# Patient Record
Sex: MALE | Race: WHITE | Employment: FULL TIME | URBAN - NONMETROPOLITAN AREA
[De-identification: names, ages, dates, MRNs, and addresses within clinical notes are randomized per-mention and may not be internally consistent; named-entity substitution may affect disease eponyms.]

---

## 2018-10-17 ENCOUNTER — HOSPITAL ENCOUNTER (OUTPATIENT)
Dept: ULTRASOUND IMAGING | Age: 28
Discharge: HOME | End: 2018-10-17
Payer: COMMERCIAL

## 2018-10-17 DIAGNOSIS — N50.82 SCROTAL PAIN: ICD-10-CM

## 2018-10-17 DIAGNOSIS — N50.89 SCROTAL SWELLING: ICD-10-CM

## 2018-10-17 PROCEDURE — 76870 US EXAM SCROTUM: CPT

## 2018-10-29 ENCOUNTER — TELEPHONE (OUTPATIENT)
Dept: UROLOGY | Age: 28
End: 2018-10-29

## 2018-10-29 ENCOUNTER — OFFICE VISIT (OUTPATIENT)
Dept: UROLOGY | Age: 28
End: 2018-10-29
Payer: COMMERCIAL

## 2018-10-29 VITALS
HEIGHT: 71 IN | SYSTOLIC BLOOD PRESSURE: 128 MMHG | DIASTOLIC BLOOD PRESSURE: 78 MMHG | BODY MASS INDEX: 33.74 KG/M2 | WEIGHT: 241 LBS

## 2018-10-29 DIAGNOSIS — N43.3 HYDROCELE, UNSPECIFIED HYDROCELE TYPE: Primary | ICD-10-CM

## 2018-10-29 DIAGNOSIS — R35.1 NOCTURIA: ICD-10-CM

## 2018-10-29 DIAGNOSIS — Z01.818 PRE-OP TESTING: ICD-10-CM

## 2018-10-29 DIAGNOSIS — N43.3 LEFT HYDROCELE: Primary | ICD-10-CM

## 2018-10-29 DIAGNOSIS — I10 HYPERTENSION, UNSPECIFIED TYPE: ICD-10-CM

## 2018-10-29 LAB
BILIRUBIN URINE: NEGATIVE
BLOOD URINE, POC: NEGATIVE
CHARACTER, URINE: CLEAR
COLOR, URINE: YELLOW
GLUCOSE URINE: NEGATIVE MG/DL
KETONES, URINE: NEGATIVE
LEUKOCYTE CLUMPS, URINE: NEGATIVE
NITRITE, URINE: NEGATIVE
PH, URINE: 6
PROTEIN, URINE: NEGATIVE MG/DL
SPECIFIC GRAVITY, URINE: 1.02 (ref 1–1.03)
UROBILINOGEN, URINE: 0.2 EU/DL

## 2018-10-29 PROCEDURE — 51798 US URINE CAPACITY MEASURE: CPT | Performed by: NURSE PRACTITIONER

## 2018-10-29 PROCEDURE — 99203 OFFICE O/P NEW LOW 30 MIN: CPT | Performed by: NURSE PRACTITIONER

## 2018-10-29 PROCEDURE — 81003 URINALYSIS AUTO W/O SCOPE: CPT | Performed by: NURSE PRACTITIONER

## 2018-10-29 RX ORDER — KETOROLAC TROMETHAMINE 10 MG/1
10 TABLET, FILM COATED ORAL EVERY 6 HOURS PRN
Qty: 20 TABLET | Refills: 0 | Status: SHIPPED | OUTPATIENT
Start: 2018-10-29 | End: 2018-12-07

## 2018-10-29 RX ORDER — LISINOPRIL 20 MG/1
20 TABLET ORAL DAILY
COMMUNITY

## 2018-10-29 NOTE — TELEPHONE ENCOUNTER
DO NOT TAKE ASPIRIN, PLAVIX, COUMADIN, OR MOTRIN-LIKE DRUGS 7 DAYS      PRIOR TO SURGERY AND 3 DAYS FOLLOWING     Sophie Stevens 1990 Diagnosis: Hydrocele, left    Surgical Physician:   Dr Virginia Alegria. Dana Kirkpatrick have been scheduled for the procedure marked below:     Surgery -Left hydrocelectomy         Date: 11/29/18     Anesthesia: Anesthesiologist (General/Spinal)     Place of Service: [facility]         Please be at the Outpatient Department Second Floor at:  12:30am        INSTRUCTIONS AS MARKED BELOW:    1. We prefer that you shower or bathe with liquid antibacterial soap, like Dial,the day of surgery. 2.  If you are having (General) Anesthesia:  DO NOT eat or drink anything after midnight before surgery. 3.  Pre-op testing to be done on or before 11/15/18. Fasting required 8 hours prior. 4.  Take the blood pressure medicine am of surgery with SIP of water. 5.  PLEASE BRING THIS LETTER WITH YOU AND SHOW IT TO THE  AT [place] [address]. 6.  Does patient have a [employer]? No  7. Please send a copy to the Family Dr: Warren Kwong MD  8. If you are a Medicare patient please bring your home Medications with you. 9. Please ensure to bring a  with you the day of the surgery to transport you home.   10. Surgery F/U with Raymundo Guillen CNP 12/7/2018 1200pm      Date: 10/29/2018

## 2018-10-30 ENCOUNTER — TELEPHONE (OUTPATIENT)
Dept: UROLOGY | Age: 28
End: 2018-10-30

## 2018-10-31 ENCOUNTER — TELEPHONE (OUTPATIENT)
Dept: UROLOGY | Age: 28
End: 2018-10-31

## 2018-11-01 LAB — POST VOID RESIDUAL (PVR): 45 ML

## 2018-11-06 ENCOUNTER — TELEPHONE (OUTPATIENT)
Dept: UROLOGY | Age: 28
End: 2018-11-06

## 2018-11-14 ENCOUNTER — TELEPHONE (OUTPATIENT)
Dept: UROLOGY | Age: 28
End: 2018-11-14

## 2018-11-15 ENCOUNTER — HOSPITAL ENCOUNTER (OUTPATIENT)
Age: 28
Discharge: HOME | End: 2018-11-15
Payer: COMMERCIAL

## 2018-11-15 DIAGNOSIS — N43.3 LEFT HYDROCELE: ICD-10-CM

## 2018-11-15 DIAGNOSIS — I10 HYPERTENSION, UNSPECIFIED TYPE: ICD-10-CM

## 2018-11-15 DIAGNOSIS — Z01.818 PRE-OP TESTING: ICD-10-CM

## 2018-11-15 LAB
ANION GAP SERPL CALCULATED.3IONS-SCNC: 13 MEQ/L (ref 8–16)
BASOPHILS # BLD: 1.1 %
BASOPHILS ABSOLUTE: 0.1 THOU/MM3 (ref 0–0.1)
BILIRUBIN URINE: NEGATIVE
BLOOD, URINE: NEGATIVE
BUN BLDV-MCNC: 12 MG/DL (ref 7–22)
CALCIUM SERPL-MCNC: 9.8 MG/DL (ref 8.5–10.5)
CHARACTER, URINE: CLEAR
CHLORIDE BLD-SCNC: 101 MEQ/L (ref 98–111)
CO2: 27 MEQ/L (ref 23–33)
COLOR: YELLOW
CREAT SERPL-MCNC: 0.9 MG/DL (ref 0.4–1.2)
EKG ATRIAL RATE: 85 BPM
EKG P AXIS: 61 DEGREES
EKG P-R INTERVAL: 146 MS
EKG Q-T INTERVAL: 366 MS
EKG QRS DURATION: 86 MS
EKG QTC CALCULATION (BAZETT): 435 MS
EKG R AXIS: 48 DEGREES
EKG T AXIS: 43 DEGREES
EKG VENTRICULAR RATE: 85 BPM
EOSINOPHIL # BLD: 1.6 %
EOSINOPHILS ABSOLUTE: 0.1 THOU/MM3 (ref 0–0.4)
ERYTHROCYTE [DISTWIDTH] IN BLOOD BY AUTOMATED COUNT: 11.8 % (ref 11.5–14.5)
ERYTHROCYTE [DISTWIDTH] IN BLOOD BY AUTOMATED COUNT: 38 FL (ref 35–45)
GFR SERPL CREATININE-BSD FRML MDRD: > 90 ML/MIN/1.73M2
GLUCOSE BLD-MCNC: 92 MG/DL (ref 70–108)
GLUCOSE URINE: NEGATIVE MG/DL
HCT VFR BLD CALC: 48.9 % (ref 42–52)
HEMOGLOBIN: 16.5 GM/DL (ref 14–18)
IMMATURE GRANS (ABS): 0.04 THOU/MM3 (ref 0–0.07)
IMMATURE GRANULOCYTES: 0.5 %
KETONES, URINE: NEGATIVE
LEUKOCYTE ESTERASE, URINE: NEGATIVE
LYMPHOCYTES # BLD: 31.6 %
LYMPHOCYTES ABSOLUTE: 2.6 THOU/MM3 (ref 1–4.8)
MCH RBC QN AUTO: 30.2 PG (ref 26–33)
MCHC RBC AUTO-ENTMCNC: 33.7 GM/DL (ref 32.2–35.5)
MCV RBC AUTO: 89.4 FL (ref 80–94)
MONOCYTES # BLD: 8.2 %
MONOCYTES ABSOLUTE: 0.7 THOU/MM3 (ref 0.4–1.3)
NITRITE, URINE: NEGATIVE
NUCLEATED RED BLOOD CELLS: 0 /100 WBC
PH UA: 6
PLATELET # BLD: 266 THOU/MM3 (ref 130–400)
PMV BLD AUTO: 10.4 FL (ref 9.4–12.4)
POTASSIUM SERPL-SCNC: 4.3 MEQ/L (ref 3.5–5.2)
PROTEIN UA: NEGATIVE
RBC # BLD: 5.47 MILL/MM3 (ref 4.7–6.1)
SEG NEUTROPHILS: 57 %
SEGMENTED NEUTROPHILS ABSOLUTE COUNT: 4.6 THOU/MM3 (ref 1.8–7.7)
SODIUM BLD-SCNC: 141 MEQ/L (ref 135–145)
SPECIFIC GRAVITY, URINE: 1.02 (ref 1–1.03)
UROBILINOGEN, URINE: 0.2 EU/DL
WBC # BLD: 8.1 THOU/MM3 (ref 4.8–10.8)

## 2018-11-15 PROCEDURE — 36415 COLL VENOUS BLD VENIPUNCTURE: CPT

## 2018-11-15 PROCEDURE — 80048 BASIC METABOLIC PNL TOTAL CA: CPT

## 2018-11-15 PROCEDURE — 85025 COMPLETE CBC W/AUTO DIFF WBC: CPT

## 2018-11-15 PROCEDURE — 81003 URINALYSIS AUTO W/O SCOPE: CPT

## 2018-11-15 PROCEDURE — 93005 ELECTROCARDIOGRAM TRACING: CPT | Performed by: NURSE PRACTITIONER

## 2018-11-15 PROCEDURE — 93010 ELECTROCARDIOGRAM REPORT: CPT | Performed by: INTERNAL MEDICINE

## 2018-11-29 ENCOUNTER — HOSPITAL ENCOUNTER (OUTPATIENT)
Age: 28
Setting detail: OUTPATIENT SURGERY
Discharge: HOME | End: 2018-11-29
Attending: UROLOGY | Admitting: UROLOGY
Payer: COMMERCIAL

## 2018-11-29 ENCOUNTER — ANESTHESIA EVENT (OUTPATIENT)
Dept: OPERATING ROOM | Age: 28
End: 2018-11-29
Payer: COMMERCIAL

## 2018-11-29 ENCOUNTER — ANESTHESIA (OUTPATIENT)
Dept: OPERATING ROOM | Age: 28
End: 2018-11-29
Payer: COMMERCIAL

## 2018-11-29 VITALS
BODY MASS INDEX: 32.99 KG/M2 | WEIGHT: 235.67 LBS | RESPIRATION RATE: 16 BRPM | DIASTOLIC BLOOD PRESSURE: 70 MMHG | HEIGHT: 71 IN | HEART RATE: 90 BPM | OXYGEN SATURATION: 95 % | SYSTOLIC BLOOD PRESSURE: 137 MMHG | TEMPERATURE: 97.2 F

## 2018-11-29 VITALS
OXYGEN SATURATION: 98 % | SYSTOLIC BLOOD PRESSURE: 92 MMHG | DIASTOLIC BLOOD PRESSURE: 42 MMHG | RESPIRATION RATE: 1 BRPM

## 2018-11-29 DIAGNOSIS — N43.2 OTHER HYDROCELE: Primary | ICD-10-CM

## 2018-11-29 PROCEDURE — 2500000003 HC RX 250 WO HCPCS: Performed by: UROLOGY

## 2018-11-29 PROCEDURE — 88302 TISSUE EXAM BY PATHOLOGIST: CPT

## 2018-11-29 PROCEDURE — 3600000002 HC SURGERY LEVEL 2 BASE: Performed by: UROLOGY

## 2018-11-29 PROCEDURE — 7100000011 HC PHASE II RECOVERY - ADDTL 15 MIN: Performed by: UROLOGY

## 2018-11-29 PROCEDURE — 2580000003 HC RX 258

## 2018-11-29 PROCEDURE — 3700000000 HC ANESTHESIA ATTENDED CARE: Performed by: UROLOGY

## 2018-11-29 PROCEDURE — 6370000000 HC RX 637 (ALT 250 FOR IP): Performed by: NURSE PRACTITIONER

## 2018-11-29 PROCEDURE — 2500000003 HC RX 250 WO HCPCS: Performed by: NURSE ANESTHETIST, CERTIFIED REGISTERED

## 2018-11-29 PROCEDURE — 3600000012 HC SURGERY LEVEL 2 ADDTL 15MIN: Performed by: UROLOGY

## 2018-11-29 PROCEDURE — 7100000001 HC PACU RECOVERY - ADDTL 15 MIN: Performed by: UROLOGY

## 2018-11-29 PROCEDURE — 7100000000 HC PACU RECOVERY - FIRST 15 MIN: Performed by: UROLOGY

## 2018-11-29 PROCEDURE — 7100000010 HC PHASE II RECOVERY - FIRST 15 MIN: Performed by: UROLOGY

## 2018-11-29 PROCEDURE — 3700000001 HC ADD 15 MINUTES (ANESTHESIA): Performed by: UROLOGY

## 2018-11-29 PROCEDURE — 55040 REMOVAL OF HYDROCELE: CPT | Performed by: UROLOGY

## 2018-11-29 PROCEDURE — 6360000002 HC RX W HCPCS

## 2018-11-29 PROCEDURE — 2580000003 HC RX 258: Performed by: NURSE ANESTHETIST, CERTIFIED REGISTERED

## 2018-11-29 PROCEDURE — 6360000002 HC RX W HCPCS: Performed by: NURSE PRACTITIONER

## 2018-11-29 PROCEDURE — 2709999900 HC NON-CHARGEABLE SUPPLY: Performed by: UROLOGY

## 2018-11-29 PROCEDURE — 6360000002 HC RX W HCPCS: Performed by: NURSE ANESTHETIST, CERTIFIED REGISTERED

## 2018-11-29 RX ORDER — MIDAZOLAM HYDROCHLORIDE 1 MG/ML
INJECTION INTRAMUSCULAR; INTRAVENOUS PRN
Status: DISCONTINUED | OUTPATIENT
Start: 2018-11-29 | End: 2018-11-29 | Stop reason: SDUPTHER

## 2018-11-29 RX ORDER — OXYCODONE HYDROCHLORIDE AND ACETAMINOPHEN 5; 325 MG/1; MG/1
1 TABLET ORAL EVERY 6 HOURS PRN
Qty: 20 TABLET | Refills: 0 | Status: SHIPPED | OUTPATIENT
Start: 2018-11-29 | End: 2018-12-04

## 2018-11-29 RX ORDER — ONDANSETRON 2 MG/ML
4 INJECTION INTRAMUSCULAR; INTRAVENOUS EVERY 4 HOURS PRN
Status: DISCONTINUED | OUTPATIENT
Start: 2018-11-29 | End: 2018-11-29 | Stop reason: HOSPADM

## 2018-11-29 RX ORDER — SODIUM CHLORIDE 9 MG/ML
INJECTION, SOLUTION INTRAVENOUS CONTINUOUS
Status: DISCONTINUED | OUTPATIENT
Start: 2018-11-29 | End: 2018-11-29 | Stop reason: HOSPADM

## 2018-11-29 RX ORDER — OXYCODONE HYDROCHLORIDE AND ACETAMINOPHEN 5; 325 MG/1; MG/1
2 TABLET ORAL EVERY 4 HOURS PRN
Status: DISCONTINUED | OUTPATIENT
Start: 2018-11-29 | End: 2018-11-29 | Stop reason: HOSPADM

## 2018-11-29 RX ORDER — OXYCODONE HYDROCHLORIDE AND ACETAMINOPHEN 5; 325 MG/1; MG/1
1 TABLET ORAL EVERY 4 HOURS PRN
Status: DISCONTINUED | OUTPATIENT
Start: 2018-11-29 | End: 2018-11-29 | Stop reason: HOSPADM

## 2018-11-29 RX ORDER — PROMETHAZINE HYDROCHLORIDE 25 MG/ML
25 INJECTION, SOLUTION INTRAMUSCULAR; INTRAVENOUS ONCE
Status: COMPLETED | OUTPATIENT
Start: 2018-11-29 | End: 2018-11-29

## 2018-11-29 RX ORDER — SODIUM CHLORIDE 9 MG/ML
INJECTION, SOLUTION INTRAVENOUS CONTINUOUS PRN
Status: DISCONTINUED | OUTPATIENT
Start: 2018-11-29 | End: 2018-11-29 | Stop reason: SDUPTHER

## 2018-11-29 RX ORDER — KETOROLAC TROMETHAMINE 30 MG/ML
30 INJECTION, SOLUTION INTRAMUSCULAR; INTRAVENOUS EVERY 6 HOURS PRN
Status: DISCONTINUED | OUTPATIENT
Start: 2018-11-29 | End: 2018-11-29 | Stop reason: HOSPADM

## 2018-11-29 RX ORDER — CIPROFLOXACIN 2 MG/ML
400 INJECTION, SOLUTION INTRAVENOUS
Status: COMPLETED | OUTPATIENT
Start: 2018-11-29 | End: 2018-11-29

## 2018-11-29 RX ORDER — PROPOFOL 10 MG/ML
INJECTION, EMULSION INTRAVENOUS PRN
Status: DISCONTINUED | OUTPATIENT
Start: 2018-11-29 | End: 2018-11-29 | Stop reason: SDUPTHER

## 2018-11-29 RX ORDER — PROMETHAZINE HYDROCHLORIDE 25 MG/ML
INJECTION, SOLUTION INTRAMUSCULAR; INTRAVENOUS
Status: DISCONTINUED
Start: 2018-11-29 | End: 2018-11-29 | Stop reason: HOSPADM

## 2018-11-29 RX ORDER — MORPHINE SULFATE 2 MG/ML
2 INJECTION, SOLUTION INTRAMUSCULAR; INTRAVENOUS
Status: DISCONTINUED | OUTPATIENT
Start: 2018-11-29 | End: 2018-11-29 | Stop reason: HOSPADM

## 2018-11-29 RX ORDER — BUPIVACAINE HYDROCHLORIDE 5 MG/ML
INJECTION, SOLUTION PERINEURAL PRN
Status: DISCONTINUED | OUTPATIENT
Start: 2018-11-29 | End: 2018-11-29 | Stop reason: HOSPADM

## 2018-11-29 RX ORDER — CEPHALEXIN 500 MG/1
500 CAPSULE ORAL 3 TIMES DAILY
Qty: 15 CAPSULE | Refills: 0 | Status: SHIPPED | OUTPATIENT
Start: 2018-11-29 | End: 2018-12-04

## 2018-11-29 RX ORDER — ONDANSETRON 2 MG/ML
INJECTION INTRAMUSCULAR; INTRAVENOUS PRN
Status: DISCONTINUED | OUTPATIENT
Start: 2018-11-29 | End: 2018-11-29 | Stop reason: SDUPTHER

## 2018-11-29 RX ORDER — MORPHINE SULFATE 2 MG/ML
4 INJECTION, SOLUTION INTRAMUSCULAR; INTRAVENOUS
Status: DISCONTINUED | OUTPATIENT
Start: 2018-11-29 | End: 2018-11-29 | Stop reason: HOSPADM

## 2018-11-29 RX ORDER — DEXAMETHASONE SODIUM PHOSPHATE 4 MG/ML
INJECTION, SOLUTION INTRA-ARTICULAR; INTRALESIONAL; INTRAMUSCULAR; INTRAVENOUS; SOFT TISSUE PRN
Status: DISCONTINUED | OUTPATIENT
Start: 2018-11-29 | End: 2018-11-29 | Stop reason: SDUPTHER

## 2018-11-29 RX ORDER — LIDOCAINE HYDROCHLORIDE 20 MG/ML
INJECTION, SOLUTION INFILTRATION; PERINEURAL PRN
Status: DISCONTINUED | OUTPATIENT
Start: 2018-11-29 | End: 2018-11-29 | Stop reason: SDUPTHER

## 2018-11-29 RX ORDER — HYDROMORPHONE HCL 110MG/55ML
PATIENT CONTROLLED ANALGESIA SYRINGE INTRAVENOUS PRN
Status: DISCONTINUED | OUTPATIENT
Start: 2018-11-29 | End: 2018-11-29 | Stop reason: SDUPTHER

## 2018-11-29 RX ORDER — ATROPA BELLADONNA AND OPIUM 16.2; 3 MG/1; MG/1
30 SUPPOSITORY RECTAL EVERY 8 HOURS PRN
Status: DISCONTINUED | OUTPATIENT
Start: 2018-11-29 | End: 2018-11-29 | Stop reason: HOSPADM

## 2018-11-29 RX ADMIN — HYDROMORPHONE HYDROCHLORIDE 1 MG: 2 INJECTION INTRAMUSCULAR; INTRAVENOUS; SUBCUTANEOUS at 15:19

## 2018-11-29 RX ADMIN — MIDAZOLAM HYDROCHLORIDE 2 MG: 1 INJECTION, SOLUTION INTRAMUSCULAR; INTRAVENOUS at 15:19

## 2018-11-29 RX ADMIN — PROPOFOL 200 MG: 10 INJECTION, EMULSION INTRAVENOUS at 15:19

## 2018-11-29 RX ADMIN — SODIUM CHLORIDE: 9 INJECTION, SOLUTION INTRAVENOUS at 13:42

## 2018-11-29 RX ADMIN — PROMETHAZINE HYDROCHLORIDE 25 MG: 25 INJECTION INTRAMUSCULAR; INTRAVENOUS at 17:45

## 2018-11-29 RX ADMIN — OXYCODONE AND ACETAMINOPHEN 1 TABLET: 5; 325 TABLET ORAL at 18:18

## 2018-11-29 RX ADMIN — DEXAMETHASONE SODIUM PHOSPHATE 10 MG: 4 INJECTION, SOLUTION INTRAMUSCULAR; INTRAVENOUS at 15:25

## 2018-11-29 RX ADMIN — HYDROMORPHONE HYDROCHLORIDE 1 MG: 2 INJECTION INTRAMUSCULAR; INTRAVENOUS; SUBCUTANEOUS at 15:38

## 2018-11-29 RX ADMIN — LIDOCAINE HYDROCHLORIDE 100 MG: 20 INJECTION, SOLUTION INFILTRATION; PERINEURAL at 15:19

## 2018-11-29 RX ADMIN — ONDANSETRON 4 MG: 2 INJECTION INTRAMUSCULAR; INTRAVENOUS at 16:44

## 2018-11-29 RX ADMIN — CIPROFLOXACIN 400 MG: 2 INJECTION, SOLUTION INTRAVENOUS at 15:22

## 2018-11-29 RX ADMIN — ONDANSETRON HYDROCHLORIDE 4 MG: 4 INJECTION, SOLUTION INTRAMUSCULAR; INTRAVENOUS at 15:25

## 2018-11-29 RX ADMIN — SODIUM CHLORIDE: 9 INJECTION, SOLUTION INTRAVENOUS at 15:19

## 2018-11-29 ASSESSMENT — PULMONARY FUNCTION TESTS
PIF_VALUE: 6
PIF_VALUE: 3
PIF_VALUE: 2
PIF_VALUE: 3
PIF_VALUE: 19
PIF_VALUE: 0
PIF_VALUE: 3
PIF_VALUE: 2
PIF_VALUE: 26
PIF_VALUE: 4
PIF_VALUE: 10
PIF_VALUE: 2
PIF_VALUE: 5
PIF_VALUE: 16
PIF_VALUE: 4
PIF_VALUE: 2
PIF_VALUE: 18
PIF_VALUE: 3
PIF_VALUE: 18
PIF_VALUE: 4
PIF_VALUE: 2
PIF_VALUE: 1
PIF_VALUE: 2
PIF_VALUE: 2
PIF_VALUE: 10
PIF_VALUE: 3
PIF_VALUE: 3
PIF_VALUE: 4
PIF_VALUE: 16
PIF_VALUE: 3
PIF_VALUE: 3
PIF_VALUE: 17
PIF_VALUE: 3
PIF_VALUE: 4
PIF_VALUE: 4
PIF_VALUE: 3
PIF_VALUE: 4
PIF_VALUE: 14
PIF_VALUE: 2
PIF_VALUE: 9
PIF_VALUE: 18
PIF_VALUE: 24
PIF_VALUE: 1
PIF_VALUE: 3
PIF_VALUE: 1
PIF_VALUE: 2
PIF_VALUE: 4
PIF_VALUE: 16
PIF_VALUE: 3
PIF_VALUE: 2
PIF_VALUE: 16
PIF_VALUE: 3
PIF_VALUE: 16
PIF_VALUE: 3
PIF_VALUE: 3
PIF_VALUE: 5
PIF_VALUE: 18
PIF_VALUE: 10
PIF_VALUE: 14
PIF_VALUE: 2
PIF_VALUE: 3
PIF_VALUE: 3
PIF_VALUE: 14
PIF_VALUE: 1
PIF_VALUE: 4
PIF_VALUE: 2
PIF_VALUE: 0

## 2018-11-29 ASSESSMENT — PAIN SCALES - GENERAL
PAINLEVEL_OUTOF10: 0
PAINLEVEL_OUTOF10: 0
PAINLEVEL_OUTOF10: 3
PAINLEVEL_OUTOF10: 3
PAINLEVEL_OUTOF10: 4
PAINLEVEL_OUTOF10: 3

## 2018-11-29 ASSESSMENT — PAIN DESCRIPTION - PROGRESSION
CLINICAL_PROGRESSION: GRADUALLY IMPROVING
CLINICAL_PROGRESSION: GRADUALLY WORSENING

## 2018-11-29 ASSESSMENT — PAIN DESCRIPTION - FREQUENCY
FREQUENCY: INTERMITTENT
FREQUENCY: INTERMITTENT

## 2018-11-29 ASSESSMENT — PAIN - FUNCTIONAL ASSESSMENT: PAIN_FUNCTIONAL_ASSESSMENT: 0-10

## 2018-11-29 ASSESSMENT — PAIN DESCRIPTION - LOCATION
LOCATION: SCROTUM
LOCATION: SCROTUM

## 2018-11-29 ASSESSMENT — PAIN DESCRIPTION - ORIENTATION
ORIENTATION: LEFT
ORIENTATION: LEFT

## 2018-11-29 ASSESSMENT — PAIN DESCRIPTION - PAIN TYPE
TYPE: SURGICAL PAIN
TYPE: SURGICAL PAIN

## 2018-11-29 ASSESSMENT — PAIN DESCRIPTION - DESCRIPTORS
DESCRIPTORS: BURNING
DESCRIPTORS: BURNING

## 2018-11-29 NOTE — ANESTHESIA PRE PROCEDURE
Past Medical History:        Diagnosis Date    Hypertension        Past Surgical History:        Procedure Laterality Date    KNEE SURGERY  y+10    right knee       Social History:    Social History   Substance Use Topics    Smoking status: Never Smoker    Smokeless tobacco: Never Used    Alcohol use 7.2 oz/week     12 Cans of beer per week      Comment: SOCIALLY                                Counseling given: Not Answered      Vital Signs (Current):   Vitals:    11/20/18 1003 11/29/18 1308 11/29/18 1315   BP:  (!) 148/86    Pulse:  77    Resp:  17    Temp:  98 °F (36.7 °C)    TempSrc:  Temporal    SpO2:  97%    Weight: 240 lb (108.9 kg)  235 lb 10.8 oz (106.9 kg)   Height: 5' 11\" (1.803 m)  5' 11\" (1.803 m)                                              BP Readings from Last 3 Encounters:   11/29/18 (!) 148/86   10/29/18 128/78       NPO Status: Time of last liquid consumption: 1930                        Time of last solid consumption: 1930                        Date of last liquid consumption: 11/28/18                        Date of last solid food consumption: 11/28/18    BMI:   Wt Readings from Last 3 Encounters:   11/29/18 235 lb 10.8 oz (106.9 kg)   10/29/18 241 lb (109.3 kg)     Body mass index is 32.87 kg/m². CBC:   Lab Results   Component Value Date    WBC 8.1 11/15/2018    RBC 5.47 11/15/2018    HGB 16.5 11/15/2018    HCT 48.9 11/15/2018    MCV 89.4 11/15/2018     11/15/2018       CMP:   Lab Results   Component Value Date     11/15/2018    K 4.3 11/15/2018     11/15/2018    CO2 27 11/15/2018    BUN 12 11/15/2018    CREATININE 0.9 11/15/2018    LABGLOM >90 11/15/2018    GLUCOSE 92 11/15/2018    CALCIUM 9.8 11/15/2018       POC Tests: No results for input(s): POCGLU, POCNA, POCK, POCCL, POCBUN, POCHEMO, POCHCT in the last 72 hours.     Coags: No results found for: PROTIME, INR, APTT    HCG (If Applicable): No results found for: PREGTESTUR, PREGSERUM, HCG, HCGQUANT     ABGs: No results found for: PHART, PO2ART, JVS2XYZ, EGQ4WFQ, BEART, H3NREQVL     Type & Screen (If Applicable):  No results found for: LABABO, [address]    Anesthesia Evaluation  Patient summary reviewed and Nursing notes reviewed no history of anesthetic complications:   Airway: Mallampati: II  TM distance: >3 FB   Neck ROM: full  Mouth opening: > = 3 FB Dental:          Pulmonary: breath sounds clear to auscultation                             Cardiovascular:    (+) hypertension:,         Rhythm: regular  Rate: normal                    Neuro/Psych:               GI/Hepatic/Renal:             Endo/Other:                     Abdominal:       Abdomen: soft. Vascular:                                        Anesthesia Plan      general     ASA 2       Induction: intravenous. MIPS: Postoperative opioids intended and Prophylactic antiemetics administered. Anesthetic plan and risks discussed with patient and spouse.       Plan discussed with CRNA, attending and surgical team.                  Marylee Marek, DO   11/29/2018

## 2018-11-30 NOTE — BRIEF OP NOTE
Brief Postoperative Note  ______________________________________________________________    Patient: May Ayala  YOB: 1990  MRN: [id number]  Date of Procedure: 11/29/2018    Pre-Op Diagnosis: LEFT HYDROCELE    Post-Op Diagnosis: Same with very significant scaring and induration, difficult procedure       Procedure(s):  LEFT HYDROCELECTOMY -- ADDED PROCEDURAL SERVICES    Anesthesia: General    Surgeon(s):  Amarilys Alves MD    Assistant: none    Estimated Blood Loss (mL): 10 cc    Complications: none    Specimens:   ID Type Source Tests Collected by Time Destination   A : Left Hydrocele Sac Tissue Scrotum SURGICAL PATHOLOGY Amarilys Alves MD 11/29/2018 1559        Implants:  * No implants in log *      Drains:      Findings: hydrocele sac    Amarilys Alves MD

## 2018-12-07 ENCOUNTER — OFFICE VISIT (OUTPATIENT)
Dept: UROLOGY | Age: 28
End: 2018-12-07
Payer: COMMERCIAL

## 2018-12-07 VITALS
DIASTOLIC BLOOD PRESSURE: 78 MMHG | HEIGHT: 71 IN | WEIGHT: 241.7 LBS | SYSTOLIC BLOOD PRESSURE: 128 MMHG | BODY MASS INDEX: 33.84 KG/M2

## 2018-12-07 DIAGNOSIS — N43.2 OTHER HYDROCELE: Primary | ICD-10-CM

## 2018-12-07 LAB
BILIRUBIN URINE: NEGATIVE
BLOOD URINE, POC: NORMAL
CHARACTER, URINE: CLEAR
COLOR, URINE: YELLOW
GLUCOSE URINE: NEGATIVE MG/DL
KETONES, URINE: NEGATIVE
LEUKOCYTE CLUMPS, URINE: NEGATIVE
NITRITE, URINE: NEGATIVE
PH, URINE: 5.5
PROTEIN, URINE: NEGATIVE MG/DL
SPECIFIC GRAVITY, URINE: 1.01 (ref 1–1.03)
UROBILINOGEN, URINE: 0.2 EU/DL

## 2018-12-07 PROCEDURE — 81003 URINALYSIS AUTO W/O SCOPE: CPT | Performed by: NURSE PRACTITIONER

## 2018-12-07 PROCEDURE — 99024 POSTOP FOLLOW-UP VISIT: CPT | Performed by: NURSE PRACTITIONER

## 2021-07-14 ENCOUNTER — HOSPITAL ENCOUNTER (OUTPATIENT)
Age: 31
Discharge: HOME | End: 2021-07-14
Payer: COMMERCIAL

## 2021-07-14 ENCOUNTER — HOSPITAL ENCOUNTER (OUTPATIENT)
Dept: GENERAL RADIOLOGY | Age: 31
Discharge: HOME | End: 2021-07-14
Payer: COMMERCIAL

## 2021-07-14 DIAGNOSIS — M54.50 LOW BACK PAIN, UNSPECIFIED BACK PAIN LATERALITY, UNSPECIFIED CHRONICITY, UNSPECIFIED WHETHER SCIATICA PRESENT: ICD-10-CM

## 2021-07-14 PROCEDURE — 72100 X-RAY EXAM L-S SPINE 2/3 VWS: CPT

## 2021-11-09 ENCOUNTER — HOSPITAL ENCOUNTER (OUTPATIENT)
Dept: MRI IMAGING | Age: 31
Discharge: HOME | End: 2021-11-09
Payer: COMMERCIAL

## 2021-11-09 DIAGNOSIS — M54.50 LOW BACK PAIN, UNSPECIFIED BACK PAIN LATERALITY, UNSPECIFIED CHRONICITY, UNSPECIFIED WHETHER SCIATICA PRESENT: ICD-10-CM

## 2021-11-09 DIAGNOSIS — M54.16 RIGHT LUMBAR RADICULOPATHY: ICD-10-CM

## 2021-11-09 PROCEDURE — 72148 MRI LUMBAR SPINE W/O DYE: CPT

## 2022-01-06 ENCOUNTER — HOSPITAL ENCOUNTER (OUTPATIENT)
Dept: PHYSICAL THERAPY | Age: 32
Setting detail: THERAPIES SERIES
Discharge: HOME | End: 2022-01-06
Payer: COMMERCIAL

## 2022-01-06 PROCEDURE — 97161 PT EVAL LOW COMPLEX 20 MIN: CPT

## 2022-01-06 NOTE — PROGRESS NOTES
** PLEASE SIGN, DATE AND TIME CERTIFICATION BELOW AND RETURN TO [facility] OUTPATIENT REHABILITATION (FAX #: [phone]). ATTEST/CO-SIGN IF ACCESSING VIA [employer]. THANK YOU.**    I certify that I have examined the patient below and determined that Physical Medicine and Rehabilitation service is necessary and that I approve the established plan of care for up to 90 days or as specifically noted. Attestation, signature or co-signature of physician indicates approval of certification requirements.    ________________________ ____________ __________  Physician Signature   Date   Time  [address] [facility]  PHYSICAL THERAPY  [x] EVALUATION  [] DAILY NOTE (LAND) [] DAILY NOTE (AQUATIC ) [] PROGRESS NOTE [] DISCHARGE NOTE    [] [address] [facility]   [x] [place] [address]    [] [address] [facility]   [] [facility]    Date: 2022  Patient Name:  Esperanza Moise  : 1990  MRN: [id number]  CSN: [id number]    Referring Practitioner Colten Wilkins DO   Diagnosis Other intervertebral disc displacement, lumbosacral region [M51.27]    Treatment Diagnosis Core weakness, right hamstring tightness   Date of Evaluation 22    Additional Pertinent History HTN. Functional Outcome Measure Used Modified Oswestry   Functional Outcome Score 50 (22)       Insurance: Primary: Payor: Chalo Alas /  /  / ,   Secondary:    Authorization Information: UNABLE TO GET SPECIFIC BENEFIT DETAILS. DID LEAVE A VOICEMAIL FOR A YENY TO LET US KNOW HOW MANY VISITS, IF PRE-CERT IS REQUIRED, AND  IF SO WHERE WE GET THAT THROUGH. AT THIS POINT WE DO NOT HAVE THESE DETAILS. IT WILL BE UP TO THE PATIENT TO GET THESE BENEFITS   Visit # 1, 1/10 for progress note   Visits Allowed:    Recertification Date: 7/10/35   Physician Follow-Up: 3/26/22   Physician Orders:    History of Present Illness: Pt had back pain for 15 years but worsened over the past 3 years.  Pt was unable to push kids in [employer] or shopping cart over the past year. MRI showed L5-S1 disc herniation pressing on right S1 nerve root. Pt underwent surgery 12/21/21, and bone spurs were found and shaved off as well. SUBJECTIVE: Pt reports incision looks good and glue just fell off this morning. Pt reports incisional pain only. Pt tolerated walking 3-4 miles the past 2 days. Pt feels the best he has in 10 years. Pt gets uncomfortable with prolonged sitting and standing so tries to change position before then. Pt instructed on no twisting and 10# lifting restriction, increasing 5# every 1-2 weeks. Pt takes ibuprofen once a day for pain but only as needed. Pt used ice initially after surgery but not as of late. Social/Functional History and Current Status:  Medications and Allergies have been reviewed and are listed on Medical History Questionnaire. Robby Gonzales lives with family in a single story home with 1 stairs and no handrail to enter. Task Previous Current   ADLs  Independent Independent   IADL's Independent Independent   Ambulation Independent Independent   Transfers Independent Independent   Recreation Independent- golf, active, take daily walks Independent   Community Integration Independent Independent   Driving Active  Active    Work [employer]. Occupation: [employer] sits at [employer].   returned to work 1/4/22     Objective:    OBSERVATION   Pain Tightness at incision, denies current pain   Palpation No tenderness, scar tissue formation at incision noted   Sensation intact   Edema    Spinal Accessory Motion    Bed Mobility Independent    Transfers Independent    Ambulation Good pace, equal step length, good arm swing, steady   Stairs    Balance        POSTURE    No Deficit Deficit Comments   Forward Head x     Rounded Shoulders  x    Kyphosis x     Lordosis x     Lateral Shift x     Scoliosis x     Iliac Crest x     PSIS      ASIS      Leg Length Discrp      Slumped Sitting x         TRUNK RANGE OF MOTION   Flexion: Limited in standing, able to reach to feet to richie socks/shoes in sitting   Extension:    Lateral Flexion Left:    Lateral Flexion Right:    Rotation Left:    Rotation Right:    Trunk Range of Motion is [facility]  []     LOWER EXTREMITY RANGE OF MOTION    Left Right Comments   Hip Flexion knee to chest WFL 25% limited    Hip Extension [facility]    Hip ABDuction [facility]    Hip ADDuction      Hip Internal Rotation [facility] WFL    Hip External Rotation WFL WFL    Hip Range of Motion is [facility]  []      Knee Flexion      Knee Extension      Knee Range of Motion is [facility]  [x]       LOWER EXTREMITY STRENGTH    Left Right Comments   Hip Flexion 5 5    Hip Abduction 5 5    Hip Adduction 4 4    Hip Extension      Hip External Rotation 5 5    Knee Flexion 5- 4+    Knee Extension 5- 4+    Ankle DF 5 5    Ankle PF      LE strength is WFL []      CORE STRENGTH   Mild to moderate core weakness noted with mobility, bridge       SPECIAL TESTS (+/-)    Left Right Comments   SLR  - +    90/90 Hamstring length 70 deg 40 deg with pain    SKTC - -    DKTC      Slump      SI Compression/Distraction      VIBHA - -    MELIDAIR - -    Sindy Silversmith            TREATMENT   Precautions: 10# lifting restrictions, no twisting   Pain:     X in shaded column indicates activity completed today   Modalities Parameters/  Location  Notes                     Manual Therapy Time/Technique  Notes                     Exercise/Intervention   Notes   SKTC   x Reviewed for HEP   DKTC   x \"   Hamstring SLR stretch with strap   x \"          Seated hamstring stretch   x \"          Posterior pelvic tilt, abdominal bracing   x \"   deadbug with opp UE/LE   x \"                          Specific Interventions Next Treatment: review HEP, progress core stabilization, develop HEP    Activity/Treatment Tolerance:  [x]  Patient tolerated treatment well  []  Patient limited by fatigue  []  Patient limited by pain   []  Patient planned outlined above.   []  Continue with current plan of care  []  Modify plan of care as follows:    []  Hold pending physician visit  []  Discharge    Time In 0844   Time Out 0914   Timed Code Minutes: 0 min   Total Treatment Time: 30 min     Electronically Signed by: Nimo Abdul PT

## 2022-01-25 ENCOUNTER — HOSPITAL ENCOUNTER (OUTPATIENT)
Dept: PHYSICAL THERAPY | Age: 32
Setting detail: THERAPIES SERIES
Discharge: HOME | End: 2022-01-25
Payer: COMMERCIAL

## 2022-01-25 PROCEDURE — 97110 THERAPEUTIC EXERCISES: CPT

## 2022-01-25 NOTE — PROGRESS NOTES
[address] [facility]  PHYSICAL THERAPY  [] EVALUATION  [x] DAILY NOTE (LAND) [] DAILY NOTE (AQUATIC ) [] PROGRESS NOTE [] DISCHARGE NOTE    [] [address] [facility]   [x] [place] [address]    [] [facility]   [] Melissa Harding    Date: 2022  Patient Name:  Jose Wilson  : 1990  MRN: [id number]  CSN: [id number]    Referring Practitioner Meseret Hussein DO   Diagnosis Other intervertebral disc displacement, lumbosacral region [M51.27]    Treatment Diagnosis Core weakness, right hamstring tightness   Date of Evaluation 22    Additional Pertinent History HTN. Functional Outcome Measure Used Modified Oswestry   Functional Outcome Score  (22)       Insurance: Primary: Payor: Zi Stokes /  /  / ,   Secondary:    Authorization Information: UNABLE TO GET SPECIFIC BENEFIT DETAILS. DID LEAVE A VOICEMAIL FOR A YENY TO LET US KNOW HOW MANY VISITS, IF PRE-CERT IS REQUIRED, AND  IF SO WHERE WE GET THAT THROUGH. AT THIS POINT WE DO NOT HAVE THESE DETAILS. IT WILL BE UP TO THE PATIENT TO GET THESE BENEFITS- PRECERT REQUIRED  *RECEIVED AUTH FOR 8 VISITS OF PT INCLUDING EVAL  FROM 22 TO 22, NO SPECIFIC CPT CODES WERE APPROVED. Visit # 2,  for progress note   Visits Allowed:    Recertification Date:    Physician Follow-Up: 3/26/22   Physician Orders:    History of Present Illness: Pt had back pain for 15 years but worsened over the past 3 years. Pt was unable to push kids in stroller or shopping cart over the past year. MRI showed L5-S1 disc herniation pressing on right S1 nerve root. Pt underwent surgery 21, and bone spurs were found and shaved off as well. SUBJECTIVE: Pt reports back keeps feeling better every day. Pt notes he is at 15# lifting restriction now. Pt lifted something heavier the other day, which didn't bother him initially, but 5 minutes later in felt it in his back.  Pt continues to walk 3-4 miles per day but only doing stretches 3x/wk. Pt sometimes forgets he even had the surgery because he feels so good. OBJECTIVE:    TREATMENT   Precautions: 15# lifting restrictions, no twisting   Pain: denies    X in shaded column indicates activity completed today   Modalities Parameters/  Location  Notes                     Manual Therapy Time/Technique  Notes                     Exercise/Intervention   Notes   SKTC 3x15 sec  x    DKTC 3x15 sec  x    Hamstring SLR stretch with strap 3x15 sec  x           Seated hamstring stretch              Posterior pelvic tilt, abdominal bracing 10x5 sec  x    deadbug with opp UE/LE 10  x    Abdominal bracing with resisted knee fall outs- bilat and unilat 10 orange x    SL hip ABD with abdominal bracing 10  x             Specific Interventions Next Treatment: review HEP, progress core stabilization, develop HEP    Activity/Treatment Tolerance:  [x]  Patient tolerated treatment well  []  Patient limited by fatigue  []  Patient limited by pain   []  Patient limited by medical complications  []  Other:     Assessment: Reviewed HEP with cues for difference between pelvic tilt and abdominal bracing. Introduced resisted knee falls outs and SL hip ABD with good tolerance. Core challenged with deadbug. No adverse effects to session. Goals    Patient Goal: Play golf by March 28    Short Term Goals: defer to LT    Long Term Goals: 6 weeks  Patient will demonstrate good core engagement and postural awareness during therapy exercises without cues for safe lifting. Patient will have minimal to no lumbar AROM restriction for ease bending over to richie socks and shoes. Patient will improve right hamstring length form 40 to 60 degrees without pain to bend over and retrieve objects from floor. Patient will be independent and compliant with HEP daily to achieve above goals.           Patient Education:   []  HEP/Education Completed: Plan of Care, Goals, attendance policy, exercises listed above, avoid over activity, how to monitor response to activity   A2B Access Code: [email or web address]  []  No new Education completed  [x]  Reviewed Prior HEP      [x]  Patient verbalized and/or demonstrated understanding of education provided. []  Patient unable to verbalize and/or demonstrate understanding of education provided. Will continue education. []  Barriers to learning:     PLAN:  Treatment Recommendations: Strengthening, Range of Motion, Manual Therapy - Soft Tissue Mobilization, Home Exercise Program, Patient Education and Modalities    []  Plan of care initiated. Plan to see patient 2 times per week for 6 weeks to address the treatment planned outlined above.   [x]  Continue with current plan of care  []  Modify plan of care as follows:    []  Hold pending physician visit  []  Discharge    Time In 0730   Time Out 0803   Timed Code Minutes: 33 min   Total Treatment Time: 33 min     Electronically Signed by: Susan Suarez PT

## 2022-01-27 ENCOUNTER — HOSPITAL ENCOUNTER (OUTPATIENT)
Dept: PHYSICAL THERAPY | Age: 32
Setting detail: THERAPIES SERIES
Discharge: HOME | End: 2022-01-27
Payer: COMMERCIAL

## 2022-01-27 PROCEDURE — 97110 THERAPEUTIC EXERCISES: CPT

## 2022-01-27 NOTE — PROGRESS NOTES
[address] [facility]  PHYSICAL THERAPY  [] EVALUATION  [x] DAILY NOTE (LAND) [] DAILY NOTE (AQUATIC ) [] PROGRESS NOTE [] DISCHARGE NOTE    [] 615 [facility]   [x] [place] [address]    [] [facility]   [] Garrett Walker    Date: 2022  Patient Name:  Abdiel Benavides  : 1990  MRN: [id number]  CSN: [id number]    Referring Practitioner Daniel Adame DO   Diagnosis Other intervertebral disc displacement, lumbosacral region [M51.27]    Treatment Diagnosis Core weakness, right hamstring tightness   Date of Evaluation 22    Additional Pertinent History HTN. Functional Outcome Measure Used Modified Oswestry   Functional Outcome Score  (22)       Insurance: Primary: Payor: Yvette Davies /  /  / ,   Secondary:    Authorization Information: UNABLE TO GET SPECIFIC BENEFIT DETAILS. DID LEAVE A VOICEMAIL FOR A YENY TO LET US KNOW HOW MANY VISITS, IF PRE-CERT IS REQUIRED, AND  IF SO WHERE WE GET THAT THROUGH. AT THIS POINT WE DO NOT HAVE THESE DETAILS. IT WILL BE UP TO THE PATIENT TO GET THESE BENEFITS- PRECERT REQUIRED  *RECEIVED AUTH FOR 8 VISITS OF PT INCLUDING EVAL  FROM 22 TO 22, NO SPECIFIC CPT CODES WERE APPROVED. Visit # 3, 3/8 for progress note   Visits Allowed:    Recertification Date: 3/86/17   Physician Follow-Up: 3/26/22   Physician Orders:    History of Present Illness: Pt had back pain for 15 years but worsened over the past 3 years. Pt was unable to push kids in stroller or shopping cart over the past year. MRI showed L5-S1 disc herniation pressing on right S1 nerve root. Pt underwent surgery 21, and bone spurs were found and shaved off as well. SUBJECTIVE: Pt denies current pain. Pt notes he could tell he worked his muscles after last session but wasn't really sore.      OBJECTIVE:    TREATMENT   Precautions: 15# lifting restrictions, no twisting   Pain: denies    X in shaded column indicates activity completed today   Modalities Parameters/  Location  Notes                     Manual Therapy Time/Technique  Notes                     Exercise/Intervention   Notes   SKTC 3x15 sec  x    DKTC 3x15 sec  x    Hamstring SLR stretch with strap 3x15 sec             Seated hamstring stretch              Posterior pelvic tilt, abdominal bracing 10x5 sec  x    deadbug with opp UE/LE 10  x    Abdominal bracing with resisted knee fall outs- bilat and unilat 10 orange x    SL hip ABD with abdominal bracing 10  x           Seated on stability ball: abdominal bracing 5x5 sec  x                                           March, LAQ 5x  x      Specific Interventions Next Treatment: review HEP, progress core stabilization, develop HEP    Activity/Treatment Tolerance:  [x]  Patient tolerated treatment well  []  Patient limited by fatigue  []  Patient limited by pain   []  Patient limited by medical complications  []  Other:     Assessment: Introduced seated exercises on stability ball with challenge keeping abdominal bracing. No adverse effects to session. Goals    Patient Goal: Play golf by March 28    Short Term Goals: defer to LTGs    Long Term Goals: 6 weeks  Patient will demonstrate good core engagement and postural awareness during therapy exercises without cues for safe lifting. Patient will have minimal to no lumbar AROM restriction for ease bending over to richie socks and shoes. Patient will improve right hamstring length form 40 to 60 degrees without pain to bend over and retrieve objects from floor. Patient will be independent and compliant with HEP daily to achieve above goals. Patient Education:   [x]  HEP/Education Completed: resisted knee fall outs with orange band provided   [employer] Access Code: [email or web address]  []  No new Education completed  [x]  Reviewed Prior HEP      [x]  Patient verbalized and/or demonstrated understanding of education provided.   []  Patient unable to verbalize and/or demonstrate understanding of education provided. Will continue education. []  Barriers to learning:     PLAN:  Treatment Recommendations: Strengthening, Range of Motion, Manual Therapy - Soft Tissue Mobilization, Home Exercise Program, Patient Education and Modalities    []  Plan of care initiated. Plan to see patient 2 times per week for 6 weeks to address the treatment planned outlined above.   [x]  Continue with current plan of care  []  Modify plan of care as follows:    []  Hold pending physician visit  []  Discharge    Time In 0734   Time Out 0800   Timed Code Minutes: 26 min   Total Treatment Time: 26 min     Electronically Signed by: Laisha Romero PT

## 2022-02-01 ENCOUNTER — HOSPITAL ENCOUNTER (OUTPATIENT)
Dept: PHYSICAL THERAPY | Age: 32
Setting detail: THERAPIES SERIES
Discharge: HOME | End: 2022-02-01
Payer: COMMERCIAL

## 2022-02-01 PROCEDURE — 97110 THERAPEUTIC EXERCISES: CPT

## 2022-02-01 NOTE — PROGRESS NOTES
[address] [facility]  PHYSICAL THERAPY  [] EVALUATION  [x] DAILY NOTE (LAND) [] DAILY NOTE (AQUATIC ) [] PROGRESS NOTE [] DISCHARGE NOTE    [] [facility]   [x] [address]    [] [facility]   [] Tessa Murphy    Date: 2022  Patient Name:  Lamar Galarza  : 1990  MRN: [id number]  CSN: [id number]    Referring Practitioner Mandy Palomo DO   Diagnosis Other intervertebral disc displacement, lumbosacral region [M51.27]    Treatment Diagnosis Core weakness, right hamstring tightness   Date of Evaluation 22    Additional Pertinent History HTN. Functional Outcome Measure Used Modified Oswestry   Functional Outcome Score  (22)       Insurance: Primary: Payor: Margie Cross /  /  / ,   Secondary:    Authorization Information: UNABLE TO GET SPECIFIC BENEFIT DETAILS. DID LEAVE A VOICEMAIL FOR A YENY TO LET US KNOW HOW MANY VISITS, IF PRE-CERT IS REQUIRED, AND  IF SO WHERE WE GET THAT THROUGH. AT THIS POINT WE DO NOT HAVE THESE DETAILS. IT WILL BE UP TO THE PATIENT TO GET THESE BENEFITS- PRECERT REQUIRED  *RECEIVED AUTH FOR 8 VISITS OF PT INCLUDING EVAL  FROM 22 TO 22, NO SPECIFIC CPT CODES WERE APPROVED. Visit # 4,  for progress note   Visits Allowed:    Recertification Date:    Physician Follow-Up: 3/26/22   Physician Orders:    History of Present Illness: Pt had back pain for 15 years but worsened over the past 3 years. Pt was unable to push kids in stroller or shopping cart over the past year. MRI showed L5-S1 disc herniation pressing on right S1 nerve root. Pt underwent surgery 21, and bone spurs were found and shaved off as well. SUBJECTIVE: Pt reports some soreness in back the past couple days; unsure if its the stretches or picking something a little too heavy up. Pt hasn't had shooting pain.      OBJECTIVE:    TREATMENT   Precautions: 15# lifting restrictions, no twisting Pain: 3/10 right lumbar    X in shaded column indicates activity completed today   Modalities Parameters/  Location  Notes                     Manual Therapy Time/Technique  Notes                     Exercise/Intervention   Notes   SKTC 3x15 sec  x    DKTC 3x15 sec  x    Hamstring SLR stretch with strap 3x15 sec             Seated hamstring stretch 3x15 sec  x           Posterior pelvic tilt, abdominal bracing 10x5 sec  x PPT only   deadbug with opp UE/LE 10  x    Abdominal bracing with resisted knee fall outs- bilat and unilat 10 orange x    SL hip ABD with abdominal bracing 10  x           Seated on stability ball: abdominal bracing 5x5 sec  x                                           March, LAQ 10x  x                                           Alt UE/LE combo 5  x             Specific Interventions Next Treatment: review HEP, progress core stabilization, develop HEP    Activity/Treatment Tolerance:  [x]  Patient tolerated treatment well  []  Patient limited by fatigue  []  Patient limited by pain   []  Patient limited by medical complications  []  Other:     Assessment: Continued with current program, adding combo UE/LE on stability ball. Pt challenged with stability ball work. Ended with seated hamstring stretch. Pt c/o right lumbar soreness at end of session. Goals    Patient Goal: Play golf by March 28    Short Term Goals: defer to LTGs    Long Term Goals: 6 weeks  Patient will demonstrate good core engagement and postural awareness during therapy exercises without cues for safe lifting. Patient will have minimal to no lumbar AROM restriction for ease bending over to richie socks and shoes. Patient will improve right hamstring length form 40 to 60 degrees without pain to bend over and retrieve objects from floor. Patient will be independent and compliant with HEP daily to achieve above goals.           Patient Education:   [x]  HEP/Education Completed: seated hamstring stretch   Medbridge Access Code: [email or web address]  []  No new Education completed  [x]  Reviewed Prior HEP      [x]  Patient verbalized and/or demonstrated understanding of education provided. []  Patient unable to verbalize and/or demonstrate understanding of education provided. Will continue education. []  Barriers to learning:     PLAN:  Treatment Recommendations: Strengthening, Range of Motion, Manual Therapy - Soft Tissue Mobilization, Home Exercise Program, Patient Education and Modalities    []  Plan of care initiated. Plan to see patient 2 times per week for 6 weeks to address the treatment planned outlined above.   [x]  Continue with current plan of care  []  Modify plan of care as follows:    []  Hold pending physician visit  []  Discharge    Time In 0730   Time Out 0802   Timed Code Minutes: 32 min   Total Treatment Time: 32 min     Electronically Signed by: Vamshi Cortes PT

## 2022-02-03 ENCOUNTER — APPOINTMENT (OUTPATIENT)
Dept: PHYSICAL THERAPY | Age: 32
End: 2022-02-03
Payer: COMMERCIAL

## 2022-02-08 ENCOUNTER — HOSPITAL ENCOUNTER (OUTPATIENT)
Dept: PHYSICAL THERAPY | Age: 32
Setting detail: THERAPIES SERIES
Discharge: HOME | End: 2022-02-08
Payer: COMMERCIAL

## 2022-02-08 PROCEDURE — 97110 THERAPEUTIC EXERCISES: CPT

## 2022-02-08 NOTE — PROGRESS NOTES
[address] [facility]  PHYSICAL THERAPY  [] EVALUATION  [x] DAILY NOTE (LAND) [] DAILY NOTE (AQUATIC ) [] PROGRESS NOTE [] DISCHARGE NOTE    [] [facility]   [x] Him[address]    [] [facility]   [] Shobha Hightower    Date: 2022  Patient Name:  Sam Dos Santos  : 1990  MRN: [id number]  CSN: [id number]    Referring Practitioner Natacha Parham DO   Diagnosis Other intervertebral disc displacement, lumbosacral region [M51.27]    Treatment Diagnosis Core weakness, right hamstring tightness   Date of Evaluation 22    Additional Pertinent History HTN. Functional Outcome Measure Used Modified Oswestry   Functional Outcome Score  (22)       Insurance: Primary: Payor: Lorenza Ochoa /  /  / ,   Secondary:    Authorization Information: UNABLE TO GET SPECIFIC BENEFIT DETAILS. DID LEAVE A VOICEMAIL FOR A YENY TO LET US KNOW HOW MANY VISITS, IF PRE-CERT IS REQUIRED, AND  IF SO WHERE WE GET THAT THROUGH. AT THIS POINT WE DO NOT HAVE THESE DETAILS. IT WILL BE UP TO THE PATIENT TO GET THESE BENEFITS- PRECERT REQUIRED  *RECEIVED AUTH FOR 8 VISITS OF PT INCLUDING EVAL  FROM 22 TO 22, NO SPECIFIC CPT CODES WERE APPROVED. Visit # 5,  for progress note   Visits Allowed:    Recertification Date: 14   Physician Follow-Up: 3/26/22   Physician Orders:    History of Present Illness: Pt had back pain for 15 years but worsened over the past 3 years. Pt was unable to push kids in stroller or shopping cart over the past year. MRI showed L5-S1 disc herniation pressing on right S1 nerve root. Pt underwent surgery 21, and bone spurs were found and shaved off as well. SUBJECTIVE: Pt reports he noticed symptoms in right hamstring, similar to prior to surgery, for a couple days after last session.  Pt reports it wasn't really a pain but could just notice it and was protecting it more than usual. OBJECTIVE:    TREATMENT   Precautions: 15# lifting restrictions, no twisting   Pain: denies    X in shaded column indicates activity completed today   Modalities Parameters/  Location  Notes                     Manual Therapy Time/Technique  Notes                     Exercise/Intervention   Notes   SKTC 3x15 sec      DKTC 3x15 sec      Hamstring SLR stretch with strap 3x15 sec             Seated hamstring stretch 3x15 sec  x           Posterior pelvic tilt, abdominal bracing 10x5 sec  x PPT only   deadbug with opp UE/LE 12  x    Abdominal bracing with resisted knee fall outs- bilat and unilat 15 orange x    Hooklying straight leg lowering 10  x    SL hip ABD with abdominal bracing 10  x           Quadruped: alt UEs, alt LEs 10  x            Cat/thoracic flexion 10  x    Seated on stability ball: abdominal bracing 5x5 sec  x                                           March, LAQ 10x  x                                           Alt UE/LE combo 5               Specific Interventions Next Treatment: review HEP, progress core stabilization, develop HEP    Activity/Treatment Tolerance:  [x]  Patient tolerated treatment well  []  Patient limited by fatigue  []  Patient limited by pain   []  Patient limited by medical complications  []  Other:     Assessment: Progressed reps with few exercises, and introduced straight leg lowering, quadruped and thoracic flexion with challenge noted. Finished with seated hamstring stretch with tightness reported. No adverse effects to session. Goals    Patient Goal: Play golf by March 28    Short Term Goals: defer to LT    Long Term Goals: 6 weeks  Patient will demonstrate good core engagement and postural awareness during therapy exercises without cues for safe lifting. Patient will have minimal to no lumbar AROM restriction for ease bending over to richie socks and shoes.   Patient will improve right hamstring length form 40 to 60 degrees without pain to bend over and retrieve objects from floor. Patient will be independent and compliant with HEP daily to achieve above goals. Patient Education:   [x]  HEP/Education Completed: seated hamstring stretch   Medbridge Access Code: [email or web address]  []  No new Education completed  [x]  Reviewed Prior HEP      [x]  Patient verbalized and/or demonstrated understanding of education provided. []  Patient unable to verbalize and/or demonstrate understanding of education provided. Will continue education. []  Barriers to learning:     PLAN:  Treatment Recommendations: Strengthening, Range of Motion, Manual Therapy - Soft Tissue Mobilization, Home Exercise Program, Patient Education and Modalities    []  Plan of care initiated. Plan to see patient 2 times per week for 6 weeks to address the treatment planned outlined above.   [x]  Continue with current plan of care  []  Modify plan of care as follows:    []  Hold pending physician visit  []  Discharge    Time In 0730   Time Out 0758   Timed Code Minutes: 28 min   Total Treatment Time: 28 min     Electronically Signed by: Laisha Romero, PT

## 2022-02-10 ENCOUNTER — HOSPITAL ENCOUNTER (OUTPATIENT)
Dept: PHYSICAL THERAPY | Age: 32
Setting detail: THERAPIES SERIES
Discharge: HOME | End: 2022-02-10
Payer: COMMERCIAL

## 2022-02-10 PROCEDURE — 97110 THERAPEUTIC EXERCISES: CPT

## 2022-02-10 NOTE — PROGRESS NOTES
[address] [facility]  PHYSICAL THERAPY  [] EVALUATION  [x] DAILY NOTE (LAND) [] DAILY NOTE (AQUATIC ) [] PROGRESS NOTE [] DISCHARGE NOTE    [] [address] [facility]   [x] [address]    [] [facility]   [] Nicki Perea    Date: 2/10/2022  Patient Name:  Vicky Phan  : 1990  MRN: [id number]  CSN: [id number]    Referring Practitioner Jack Burnett DO   Diagnosis Other intervertebral disc displacement, lumbosacral region [M51.27]    Treatment Diagnosis Core weakness, right hamstring tightness   Date of Evaluation 22    Additional Pertinent History HTN. Functional Outcome Measure Used Modified Oswestry   Functional Outcome Score  (22)       Insurance: Primary: Payor: Caio Figueroa /  /  / ,   Secondary:    Authorization Information: UNABLE TO GET SPECIFIC BENEFIT DETAILS. DID LEAVE A VOICEMAIL FOR A YENY TO LET US KNOW HOW MANY VISITS, IF PRE-CERT IS REQUIRED, AND  IF SO WHERE WE GET THAT THROUGH. AT THIS POINT WE DO NOT HAVE THESE DETAILS. IT WILL BE UP TO THE PATIENT TO GET THESE BENEFITS- PRECERT REQUIRED  *RECEIVED AUTH FOR 8 VISITS OF PT INCLUDING EVAL  FROM 22 TO 22, NO SPECIFIC CPT CODES WERE APPROVED, Date extended to 22   Visit # 6,  for progress note   Visits Allowed:    Recertification Date:    Physician Follow-Up: 3/26/22   Physician Orders:    History of Present Illness: Pt had back pain for 15 years but worsened over the past 3 years. Pt was unable to push kids in stroller or shopping cart over the past year. MRI showed L5-S1 disc herniation pressing on right S1 nerve root. Pt underwent surgery 21, and bone spurs were found and shaved off as well. SUBJECTIVE: Pt reports he felt good the day after last session and was pleasantly surprised he didn't have any soreness.      OBJECTIVE:    TREATMENT   Precautions: 15# lifting restrictions, no twisting   Pain: denies    X in shaded column indicates activity completed today   Modalities Parameters/  Location  Notes                     Manual Therapy Time/Technique  Notes                     Exercise/Intervention   Notes   SKTC 3x15 sec      DKTC 3x15 sec      Hamstring SLR stretch with strap 3x15 sec             Seated hamstring stretch 3x15 sec  x           Posterior pelvic tilt, abdominal bracing 15x5 sec  x PPT only   deadbug with opp UE/LE 15  x    Abdominal bracing with resisted knee fall outs- bilat and unilat 15 orange x    Hooklying SLR 10  x    SL hip ABD with abdominal bracing 10  x    SL thoracic rotation B 5x5 sec  x    Quadruped: alt UEs, alt LEs 10  x            Cat/thoracic flexion 10      Seated on stability ball: abdominal bracing 5x5 sec                                             March, LAQ 10x  x                                           Alt UE/LE combo 10  x             Specific Interventions Next Treatment: review HEP, progress core stabilization, develop HEP    Activity/Treatment Tolerance:  [x]  Patient tolerated treatment well  []  Patient limited by fatigue  []  Patient limited by pain   []  Patient limited by medical complications  []  Other:     Assessment: Resumed alternating combo UE/LE on stability ball. Pt demos improve stability on swiss ball. Added sidelying thoracic rotation stretch to improve spinal mobility. Pt notes right hip pain with straight leg lowering so modified to straight leg lift from mat table. Goals    Patient Goal: Play golf by March 28    Short Term Goals: defer to LT    Long Term Goals: 6 weeks  Patient will demonstrate good core engagement and postural awareness during therapy exercises without cues for safe lifting. Patient will have minimal to no lumbar AROM restriction for ease bending over to richie socks and shoes. Patient will improve right hamstring length form 40 to 60 degrees without pain to bend over and retrieve objects from floor.    Patient will be independent and compliant with HEP daily to achieve above goals. Patient Education:   [x]  HEP/Education Completed: quadruped    Medbridge Access Code: [email or web address]  []  No new Education completed  [x]  Reviewed Prior HEP      [x]  Patient verbalized and/or demonstrated understanding of education provided. []  Patient unable to verbalize and/or demonstrate understanding of education provided. Will continue education. []  Barriers to learning:     PLAN:  Treatment Recommendations: Strengthening, Range of Motion, Manual Therapy - Soft Tissue Mobilization, Home Exercise Program, Patient Education and Modalities    []  Plan of care initiated. Plan to see patient 2 times per week for 6 weeks to address the treatment planned outlined above.   [x]  Continue with current plan of care  []  Modify plan of care as follows:    []  Hold pending physician visit  []  Discharge    Time In 0730   Time Out 0758   Timed Code Minutes: 28 min   Total Treatment Time: 28 min     Electronically Signed by: Raeann Hills PT

## 2022-02-15 ENCOUNTER — HOSPITAL ENCOUNTER (OUTPATIENT)
Dept: PHYSICAL THERAPY | Age: 32
Setting detail: THERAPIES SERIES
Discharge: HOME | End: 2022-02-15
Payer: COMMERCIAL

## 2022-02-15 ENCOUNTER — APPOINTMENT (OUTPATIENT)
Dept: PHYSICAL THERAPY | Age: 32
End: 2022-02-15
Payer: COMMERCIAL

## 2022-02-15 PROCEDURE — 97110 THERAPEUTIC EXERCISES: CPT

## 2022-02-15 NOTE — PROGRESS NOTES
[address] [facility]  PHYSICAL THERAPY  [] EVALUATION  [x] DAILY NOTE (LAND) [] DAILY NOTE (AQUATIC ) [] PROGRESS NOTE [] DISCHARGE NOTE    [] [address] [facility]   [x] [address]    [] [facility]   [] Fanny Tello    Date: 2/15/2022  Patient Name:  Jeffery Ricketts  : 1990  MRN: [id number]  CSN: [id number]    Referring Practitioner Yadira Kelly DO   Diagnosis No admission diagnoses are documented for this encounter. Treatment Diagnosis Core weakness, right hamstring tightness   Date of Evaluation 22    Additional Pertinent History HTN. Functional Outcome Measure Used Modified Oswestry   Functional Outcome Score  (22)       Insurance: Primary: Payor: Gwendolyn Farrar /  /  / ,   Secondary:    Authorization Information: UNABLE TO GET SPECIFIC BENEFIT DETAILS. DID LEAVE A VOICEMAIL FOR A YENY TO LET US KNOW HOW MANY VISITS, IF PRE-CERT IS REQUIRED, AND  IF SO WHERE WE GET THAT THROUGH. AT THIS POINT WE DO NOT HAVE THESE DETAILS. IT WILL BE UP TO THE PATIENT TO GET THESE BENEFITS- PRECERT REQUIRED  *RECEIVED AUTH FOR 8 VISITS OF PT INCLUDING EVAL  FROM 22 TO 22, NO SPECIFIC CPT CODES WERE APPROVED, Date extended to 22   Visit # 7, 7/8 for progress note   Visits Allowed:    Recertification Date:    Physician Follow-Up: 3/26/22   Physician Orders:    History of Present Illness: Pt had back pain for 15 years but worsened over the past 3 years. Pt was unable to push kids in stroller or shopping cart over the past year. MRI showed L5-S1 disc herniation pressing on right S1 nerve root. Pt underwent surgery 21, and bone spurs were found and shaved off as well. SUBJECTIVE: Pt reports he could feel back getting tired over the weekend, especially after shopping for 2 hours.      OBJECTIVE:    TREATMENT   Precautions: 15# lifting restrictions, no twisting   Pain: denies    X in shaded column indicates activity completed today   Modalities Parameters/  Location  Notes                     Manual Therapy Time/Technique  Notes                     Exercise/Intervention   Notes   SKTC 3x15 sec      DKTC 3x15 sec      Hamstring SLR stretch with strap 3x15 sec             Seated hamstring stretch 3x15 sec  x           Posterior pelvic tilt, abdominal bracing 15x5 sec  x PPT only   deadbug with opp UE/LE- arm overhead 15  x    Abdominal bracing with resisted knee fall outs- bilat and unilat 10 green x    Hooklying SLR 10  x    SL hip ABD with abdominal bracing 15  x    SL thoracic rotation B 5x5 sec  x    Quadruped: alt UEs, alt LEs, combo UE/LE 10  x            Cat/thoracic flexion 10      Seated on stability ball: abdominal bracing 5x5 sec                                             March, LAQ 10x  x                                           Alt UE/LE combo 10  x    TG squats- level 8 15  x      Specific Interventions Next Treatment: review HEP, progress core stabilization, develop HEP    Activity/Treatment Tolerance:  [x]  Patient tolerated treatment well  []  Patient limited by fatigue  []  Patient limited by pain   []  Patient limited by medical complications  []  Other:     Assessment: Added quadruped combo UE/LE and TG squats with good control. Pt c/o right hip pain/stretching with hamstring stretch. Instructed on sciatic nerve glide sitting with LAQ and/or 90/90 hamstring stretch with ankle pump. No adverse effects to session. Goals    Patient Goal: Play golf by March 28    Short Term Goals: defer to LTGs    Long Term Goals: 6 weeks  Patient will demonstrate good core engagement and postural awareness during therapy exercises without cues for safe lifting. Patient will have minimal to no lumbar AROM restriction for ease bending over to richie socks and shoes. Patient will improve right hamstring length form 40 to 60 degrees without pain to bend over and retrieve objects from floor.    Patient will be independent and compliant with HEP daily to achieve above goals. Patient Education:   [x]  HEP/Education Completed: sciatic nerve glides seated and supine    Medbridge Access Code: [email or web address]  []  No new Education completed  [x]  Reviewed Prior HEP      [x]  Patient verbalized and/or demonstrated understanding of education provided. []  Patient unable to verbalize and/or demonstrate understanding of education provided. Will continue education. []  Barriers to learning:     PLAN:  Treatment Recommendations: Strengthening, Range of Motion, Manual Therapy - Soft Tissue Mobilization, Home Exercise Program, Patient Education and Modalities    []  Plan of care initiated. Plan to see patient 2 times per week for 6 weeks to address the treatment planned outlined above.   [x]  Continue with current plan of care  []  Modify plan of care as follows:    []  Hold pending physician visit  []  Discharge    Time In 0729   Time Out 0758   Timed Code Minutes: 29 min   Total Treatment Time: 29 min     Electronically Signed by: Liseth Pritchett PT

## 2022-02-17 ENCOUNTER — HOSPITAL ENCOUNTER (OUTPATIENT)
Dept: PHYSICAL THERAPY | Age: 32
Setting detail: THERAPIES SERIES
Discharge: HOME | End: 2022-02-17
Payer: COMMERCIAL

## 2022-02-17 PROCEDURE — 97110 THERAPEUTIC EXERCISES: CPT

## 2022-02-17 NOTE — PROGRESS NOTES
** PLEASE SIGN, DATE AND TIME CERTIFICATION BELOW AND RETURN TO [facility] OUTPATIENT REHABILITATION (FAX #: [phone]). ATTEST/CO-SIGN IF ACCESSING VIA [employer]. THANK YOU.**    I certify that I have examined the patient below and determined that Physical Medicine and Rehabilitation service is necessary and that I approve the established plan of care for up to 90 days or as specifically noted. Attestation, signature or co-signature of physician indicates approval of certification requirements.    ________________________ ____________ __________  Physician Signature   Date   Time    [address]  [] EVALUATION  [] DAILY NOTE (LAND) [] DAILY NOTE (AQUATIC ) [x] PROGRESS NOTE [] DISCHARGE NOTE    [] [facility]   [x] [facility]    [] [facility]   [] [facility]    Date: 2022  Patient Name:  Vicky Phan  : 1990  MRN: [id number]  CSN: [id number]    Referring Practitioner Jack Burnett DO   Diagnosis Other intervertebral disc displacement, lumbosacral region [M51.27]    Treatment Diagnosis Core weakness, right hamstring tightness   Date of Evaluation 22    Additional Pertinent History HTN. Functional Outcome Measure Used Modified Oswestry   Functional Outcome Score 50 (22)       Insurance: Primary: Payor: Caio Figueroa /  /  / ,   Secondary:    Authorization Information: UNABLE TO GET SPECIFIC BENEFIT DETAILS. DID LEAVE A VOICEMAIL FOR A YENY TO LET US KNOW HOW MANY VISITS, IF PRE-CERT IS REQUIRED, AND  IF SO WHERE WE GET THAT THROUGH. AT THIS POINT WE DO NOT HAVE THESE DETAILS.   IT WILL BE UP TO THE PATIENT TO GET THESE BENEFITS- PRECERT REQUIRED  *RECEIVED AUTH FOR 8 VISITS OF PT INCLUDING EVAL  FROM 22 TO 22, NO SPECIFIC CPT CODES WERE APPROVED, Date extended to 22   Visit # 8,  for progress note   Visits Allowed:    Recertification Date: 79 Physician Follow-Up: 3/26/22   Physician Orders:    History of Present Illness: Pt had back pain for 15 years but worsened over the past 3 years. Pt was unable to push kids in stroller or shopping cart over the past year. MRI showed L5-S1 disc herniation pressing on right S1 nerve root. Pt underwent surgery 12/21/21, and bone spurs were found and shaved off as well. SUBJECTIVE: Pt reports his back felt tired yesterday from session Tuesday. OBJECTIVE:    TREATMENT   Precautions: 15# lifting restrictions, no twisting   Pain: denies    X in shaded column indicates activity completed today   Modalities Parameters/  Location  Notes                     Manual Therapy Time/Technique  Notes                     Exercise/Intervention   Notes   SKTC 3x15 sec      DKTC 3x15 sec      Hamstring SLR stretch with strap 3x15 sec             Seated hamstring stretch 3x15 sec  x           Posterior pelvic tilt, abdominal bracing 15x5 sec   PPT only   deadbug with opp UE/LE- arm overhead 15  x    Abdominal bracing with resisted knee fall outs- bilat and unilat 10 green x    Hooklying SLR 10  x    SL hip ABD with abdominal bracing 15  x    SL thoracic rotation B 5x5 sec  x    Quadruped: alt UEs, alt LEs, combo UE/LE 10  x            Cat/thoracic flexion 10      Seated on stability ball: abdominal bracing 5x5 sec                                             March, LAQ 10x  x                                           Alt UE/LE combo 10  x    TG squats- level 8 15  x    Standing hip ABD and extension 10  x             Specific Interventions Next Treatment: review HEP, progress core stabilization, develop HEP    Activity/Treatment Tolerance:  [x]  Patient tolerated treatment well  []  Patient limited by fatigue  []  Patient limited by pain   []  Patient limited by medical complications  []  Other:     Assessment: Introduced standing hip ABD and extension with cues for posture. Pt progressing well toward goals.  Right hip flexibility WFL with no difficulty donning socks and shoes; however right hamstring remains extremely tight. Pt requires periodic cues for abdominal bracing during exercises but overall improved awareness. Pt demos continued lower abdominal weakness affecting spinal stability. Pt will benefit from additional PT to address core stabilization and improve strength for safe lifting household items and children and return to golfing. Goals    Patient Goal: Play golf by March 28    Short Term Goals: defer to LTGs    Long Term Goals: 6 weeks + 6 weeks  Patient will demonstrate good core engagement and postural awareness during therapy exercises without cues for safe lifting. GOAL NOT MET: Pt requires occasional cues for core engagement and posture during exercises, especially new exercises. Continue Goal  Patient will have minimal to no lumbar AROM restriction for ease bending over to richie socks and shoes. GOAL MET:  Pt able to richie socks and shoes without restriction in back or hip, even in right side. Discontinue Goal  Patient will improve right hamstring length form 40 to 60 degrees without pain to bend over and retrieve objects from floor. GOAL NOT MET: right hamstring SLR length 40 deg, no change since eval.  Continue Goal  Patient will be independent and compliant with HEP daily to achieve above goals. GOAL MET:  Pt demos good compliance with current prescribed HEP, doing daily. Continue Goal   NEW GOAL: Patient will improve lower abdominal strength by holding bilateral SLR x1 minute without pain for stabilization when lifting and golfing. Patient Education:   [x]  HEP/Education Completed: sciatic nerve glides seated and supine    [employer] Access Code: [email or web address]  []  No new Education completed  [x]  Reviewed Prior HEP      [x]  Patient verbalized and/or demonstrated understanding of education provided. []  Patient unable to verbalize and/or demonstrate understanding of education provided.   Will continue education. []  Barriers to learning:     PLAN:  Treatment Recommendations: Strengthening, Range of Motion, Manual Therapy - Soft Tissue Mobilization, Home Exercise Program, Patient Education and Modalities    []  Plan of care initiated. Plan to see patient 2 times per week for 6 weeks to address the treatment planned outlined above.   [x]  Continue with current plan of care  []  Modify plan of care as follows:    []  Hold pending physician visit  []  Discharge    Time In 0800   Time Out 0830   Timed Code Minutes: 30 min   Total Treatment Time: 30 min     Electronically Signed by: Misa Steele PT

## 2022-03-03 ENCOUNTER — HOSPITAL ENCOUNTER (OUTPATIENT)
Dept: PHYSICAL THERAPY | Age: 32
Setting detail: THERAPIES SERIES
Discharge: HOME | End: 2022-03-03
Payer: COMMERCIAL

## 2022-03-03 PROCEDURE — 97110 THERAPEUTIC EXERCISES: CPT

## 2022-03-03 NOTE — PROGRESS NOTES
[address] [facility]  PHYSICAL THERAPY  [] EVALUATION  [x] DAILY NOTE (LAND) [] DAILY NOTE (AQUATIC ) [] PROGRESS NOTE [] DISCHARGE NOTE    [] [address] [facility]   [x] [address]    [] [address] [facility]   [] Tessa Murphy    Date: 3/3/2022  Patient Name:  Lamar Galarza  : 1990  MRN: [id number]  CSN: [id number]    Referring Practitioner Mandy Palomo DO   Diagnosis No admission diagnoses are documented for this encounter. Treatment Diagnosis Core weakness, right hamstring tightness   Date of Evaluation 22    Additional Pertinent History HTN. Functional Outcome Measure Used Modified Oswestry   Functional Outcome Score  (22)       Insurance: Primary: Payor: [address] /  /  / ,   Secondary:    Authorization Information: UNABLE TO GET SPECIFIC BENEFIT DETAILS. DID LEAVE A VOICEMAIL FOR A YENY TO LET US KNOW HOW MANY VISITS, IF PRE-CERT IS REQUIRED, AND  IF SO WHERE WE GET THAT THROUGH. AT THIS POINT WE DO NOT HAVE THESE DETAILS. IT WILL BE UP TO THE PATIENT TO GET THESE BENEFITS- PRECERT REQUIRED  *RECEIVED AUTH FOR 8 VISITS OF PT INCLUDING EVAL  FROM 22 TO 22, NO SPECIFIC CPT CODES WERE APPROVED, Date extended to 22   Visit # 8,  for progress note   Visits Allowed:    Recertification Date:    Physician Follow-Up: 3/26/22   Physician Orders:    History of Present Illness: Pt had back pain for 15 years but worsened over the past 3 years. Pt was unable to push kids in stroller or shopping cart over the past year. MRI showed L5-S1 disc herniation pressing on right S1 nerve root. Pt underwent surgery 21, and bone spurs were found and shaved off as well.       SUBJECTIVE: Patient reporting that he is able to  youngest daughter who is right about his 30 pound weight limit at this time and noted a little more fatigue/soreness on right side low back with this.    OBJECTIVE:    TREATMENT   Precautions: 15# lifting restrictions, no twisting   Pain: denies    X in shaded column indicates activity completed today   Modalities Parameters/  Location  Notes                     Manual Therapy Time/Technique  Notes                     Exercise/Intervention   Notes   SKTC 3x15 sec      DKTC 3x15 sec      Hamstring SLR stretch with strap 3x15 sec             Seated hamstring stretch 3x15 sec  x Performed standing at step on 3/3          Posterior pelvic tilt, abdominal bracing 15x5 sec  x PPT only   deadbug with opp UE/LE- arm overhead 15  x    Abdominal bracing with resisted knee fall outs- bilat and unilat 10 green x    Hooklying SLR 2x10  x    SL hip ABD with abdominal bracing 15  x    SL thoracic rotation B 5x5 sec  x    Quadruped: alt UEs, alt LEs, combo UE/LE                      Fire hydrant 10  2x5  X  x    Bridge  Bridge with marching alternating 10x3 s  5 each leg  X  x    Cat/thoracic flexion 10      Seated on stability ball: abdominal bracing 5x5 sec                                             March, LAQ 10x  x                                           Alt UE/LE combo 10  x    TG squats- level 8 15  x    Standing hip ABD and extension 10  x    Narrow stance on airex with chops L/R with weight ball  10  x      Specific Interventions Next Treatment: review HEP, progress core stabilization, develop HEP    Activity/Treatment Tolerance:  [x]  Patient tolerated treatment well  []  Patient limited by fatigue  []  Patient limited by pain   []  Patient limited by medical complications  []  Other:     Assessment: Added bridging, foam with weight and fire hydrant in quadruped position with patient tolerating well. Patient reporting of having a little fatigue during session but denies pain when finished.     Goals    Patient Goal: Play golf by March 28    Short Term Goals: defer to LTGs    Long Term Goals: 6 weeks + 6 weeks  Patient will demonstrate good core engagement and postural awareness during therapy exercises without cues for safe lifting. Patient will improve right hamstring length form 40 to 60 degrees without pain to bend over and retrieve objects from floor. Patient will be independent and compliant with HEP daily to achieve above goals. NEW GOAL: Patient will improve lower abdominal strength by holding bilateral SLR x1 minute without pain for stabilization when lifting and golfing. Patient Education:   [x]  HEP/Education Completed: handout given for bridges    [facility] Access Code: [email or web address], [email or web address]  []  No new Education completed  [x]  Reviewed Prior HEP      [x]  Patient verbalized and/or demonstrated understanding of education provided. []  Patient unable to verbalize and/or demonstrate understanding of education provided. Will continue education. []  Barriers to learning:     PLAN:  Treatment Recommendations: Strengthening, Range of Motion, Manual Therapy - Soft Tissue Mobilization, Home Exercise Program, Patient Education and Modalities    []  Plan of care initiated. Plan to see patient 2 times per week for 6 weeks to address the treatment planned outlined above.   [x]  Continue with current plan of care  []  Modify plan of care as follows:    []  Hold pending physician visit  []  Discharge    Time In 1132   Time Out 1159   Timed Code Minutes: 27 min   Total Treatment Time: 27 min     Electronically Signed by: Verna Elizabeth PTA

## 2022-03-07 ENCOUNTER — HOSPITAL ENCOUNTER (OUTPATIENT)
Dept: PHYSICAL THERAPY | Age: 32
Setting detail: THERAPIES SERIES
Discharge: HOME | End: 2022-03-07
Payer: COMMERCIAL

## 2022-03-07 PROCEDURE — 97110 THERAPEUTIC EXERCISES: CPT

## 2022-03-07 NOTE — PROGRESS NOTES
[facility]  PHYSICAL THERAPY  [] EVALUATION  [x] DAILY NOTE (LAND) [] DAILY NOTE (AQUATIC ) [] PROGRESS NOTE [] DISCHARGE NOTE    [] [facility]   [x] [address]    [] [facility]   [] Hermelinda Westbrook    Date: 3/7/2022  Patient Name:  Major Angel  : 1990  MRN: [id number]  CSN: [id number]    Referring Practitioner Gideon Toure,    Diagnosis No admission diagnoses are documented for this encounter. Treatment Diagnosis Core weakness, right hamstring tightness   Date of Evaluation 22    Additional Pertinent History HTN. Functional Outcome Measure Used Modified Oswestry   Functional Outcome Score  (22)       Insurance: Primary: Payor: [address] /  /  / ,   Secondary:    Authorization Information: UNABLE TO GET SPECIFIC BENEFIT DETAILS. DID LEAVE A VOICEMAIL FOR A YENY TO LET US KNOW HOW MANY VISITS, IF PRE-CERT IS REQUIRED, AND  IF SO WHERE WE GET THAT THROUGH. AT THIS POINT WE DO NOT HAVE THESE DETAILS. IT WILL BE UP TO THE PATIENT TO GET THESE BENEFITS- PRECERT REQUIRED  *RECEIVED AUTH FOR 8 VISITS OF PT INCLUDING EVAL  FROM 22 TO 22, NO SPECIFIC CPT CODES WERE APPROVED, Date extended to 22  New auth received for 6 visits 2/15/22 to 3/18/22   Visit # 9,  for progress note   Visits Allowed: 8+6   Recertification Date:    Physician Follow-Up: 3/26/22   Physician Orders:    History of Present Illness: Pt had back pain for 15 years but worsened over the past 3 years. Pt was unable to push kids in stroller or shopping cart over the past year. MRI showed L5-S1 disc herniation pressing on right S1 nerve root. Pt underwent surgery 21, and bone spurs were found and shaved off as well. SUBJECTIVE: Patient reporting he did the most lifting over the weekend than he has since sx, and had min to moderate soreness/fatigue.  Pt reporting no pain at end of session. Goals    Patient Goal: Play golf by March 28    Short Term Goals: defer to LT    Long Term Goals: 6 weeks + 6 weeks  Patient will demonstrate good core engagement and postural awareness during therapy exercises without cues for safe lifting. Patient will improve right hamstring length form 40 to 60 degrees without pain to bend over and retrieve objects from floor. Patient will be independent and compliant with HEP daily to achieve above goals. NEW GOAL: Patient will improve lower abdominal strength by holding bilateral SLR x1 minute without pain for stabilization when lifting and golfing. Patient Education:   [x]  HEP/Education Completed: handout given for [email or web address] Access Code: [email or web address], [id number]  []  No new Education completed  [x]  Reviewed Prior HEP      [x]  Patient verbalized and/or demonstrated understanding of education provided. []  Patient unable to verbalize and/or demonstrate understanding of education provided. Will continue education. []  Barriers to learning:     PLAN:  Treatment Recommendations: Strengthening, Range of Motion, Manual Therapy - Soft Tissue Mobilization, Home Exercise Program, Patient Education and Modalities    []  Plan of care initiated. Plan to see patient 2 times per week for 6 weeks to address the treatment planned outlined above.   [x]  Continue with current plan of care  []  Modify plan of care as follows:    []  Hold pending physician visit  []  Discharge    Time In 1310   Time Out 1345   Timed Code Minutes: 35 min   Total Treatment Time:  35min     Electronically Signed by: Tara Solis PTA

## 2022-03-09 ENCOUNTER — HOSPITAL ENCOUNTER (OUTPATIENT)
Dept: PHYSICAL THERAPY | Age: 32
Setting detail: THERAPIES SERIES
Discharge: HOME | End: 2022-03-09
Payer: COMMERCIAL

## 2022-03-09 PROCEDURE — 97110 THERAPEUTIC EXERCISES: CPT

## 2022-03-09 NOTE — DISCHARGE SUMMARY
[address] [facility]  PHYSICAL THERAPY  [] EVALUATION  [x] DAILY NOTE (LAND) [] DAILY NOTE (AQUATIC ) [] PROGRESS NOTE [x] DISCHARGE NOTE    [] [facility]   [x] [address]    [] [facility]   [] Suri Colbert    Date: 3/9/2022  Patient Name:  Coretta See  : 1990  MRN: [id number]  CSN: [id number]    Referring Practitioner Carol Villarreal DO   Diagnosis No admission diagnoses are documented for this encounter. Treatment Diagnosis Core weakness, right hamstring tightness   Date of Evaluation 22    Additional Pertinent History HTN. Functional Outcome Measure Used Modified Oswestry   Functional Outcome Score  (22)  (3/9/22)      Insurance: Primary: Payor: [address] /  /  / ,   Secondary:    Authorization Information: UNABLE TO GET SPECIFIC BENEFIT DETAILS. DID LEAVE A VOICEMAIL FOR A YENY TO LET US KNOW HOW MANY VISITS, IF PRE-CERT IS REQUIRED, AND  IF SO WHERE WE GET THAT THROUGH. AT THIS POINT WE DO NOT HAVE THESE DETAILS. IT WILL BE UP TO THE PATIENT TO GET THESE BENEFITS- PRECERT REQUIRED  *RECEIVED AUTH FOR 8 VISITS OF PT INCLUDING EVAL  FROM 22 TO 22, NO SPECIFIC CPT CODES WERE APPROVED, Date extended to 22  New auth received for 6 visits 2/15/22 to 3/18/22   Visit # 11,  for progress note   Visits Allowed: 8+6   Recertification Date:    Physician Follow-Up: 3/24/22   Physician Orders:    History of Present Illness: Pt had back pain for 15 years but worsened over the past 3 years. Pt was unable to push kids in stroller or shopping cart over the past year. MRI showed L5-S1 disc herniation pressing on right S1 nerve root. Pt underwent surgery 21, and bone spurs were found and shaved off as well. SUBJECTIVE: Patient requesting discharge as his work/life schedule won't allow him to attend next week, then he is leaving on vacation.  Pt notes he is feeling good and knows his HEP. Pt questioning if he is able to start running but instructed to wait until follow up with doctor. OBJECTIVE:    TREATMENT   Precautions: 35# lifting restrictions, no twisting   Pain: denies    X in shaded column indicates activity completed today   Modalities Parameters/  Location  Notes                     Manual Therapy Time/Technique  Notes                     Exercise/Intervention   Notes   SKTC 3x15 sec      DKTC 3x15 sec      Hamstring SLR stretch with strap 3x15 sec  x Reviewed for HEP          Seated hamstring stretch 3x15 sec   Performed standing at step on 3/3          Posterior pelvic tilt, abdominal bracing 20x5 sec   PPT only   deadbug with opp UE/LE- arm overhead 15   Focused on proper breathing   Abdominal bracing with resisted knee fall outs- bilat and unilat 10 green     Hooklying SLR 2x10      SL hip ABD with abdominal bracing 15 green  Added tband 3/7   SL thoracic rotation B 5x5 sec  x Reviewed for HEP   Quadruped: alt UEs, alt LEs, combo UE/LE                      Fire hydrant 10  2x5  X  x Combo only reviewed for HEP   Bridge with tband hip ABD  Bridge with marching alternating 10x3 s  5 each leg  X  x Reviewed for HEP   Cat/thoracic flexion 10      Seated on stability ball: abdominal bracing 5x5 sec                                             March, LAQ 10x march, 2x5 LAQ       Did not alt                                          Alt UE/LE combo 10      TG squats- level 8 20      Standing hip ABD and extension 10  x Reviewed for HEP   Narrow stance on airex with chops L/R with weight ball  10        Specific Interventions Next Treatment: review HEP, progress core stabilization, develop HEP    Activity/Treatment Tolerance:  [x]  Patient tolerated treatment well  []  Patient limited by fatigue  []  Patient limited by pain   []  Patient limited by medical complications  []  Other:     Assessment: Pt demos good progress toward goals, meeting all goals.  Pt demos good lower abdominal strength and improved right hamstring flexibility without pain allowing him to retrieve objects from floor and lift kids. Pt feels ready to try jogging and getting back into recreational basketball. Instructed patient to wait for clearance from doctor for those activities. Spend quality time reviewing HEP and progressions with handout. Instructed pt to practice gentle swinging of golf club as tolerated to prepare for return to golfing. Pt requesting discharge at this time and no concerns from PT. Goals    Patient Goal: Play golf by March 28    Short Term Goals: defer to LTGs    Long Term Goals: 6 weeks + 6 weeks  Patient will demonstrate good core engagement and postural awareness during therapy exercises without cues for safe lifting. GOAL MET:  Pt demos good core engagement during therapy and carries over to lifting but pt cautious with lifting . Discontinue Goal  Patient will improve right hamstring length form 40 to 60 degrees without pain to bend over and retrieve objects from floor. GOAL MET:  right hamstring 62 degrees without right lumbar pain. Discontinue Goal  Patient will be independent and compliant with HEP daily to achieve above goals. GOAL MET:  Pt verbalizes compliance with HEP, shortened version reviewed and handouts provided. Discontinue Goal  Patient will improve lower abdominal strength by holding bilateral SLR x1 minute without pain for stabilization when lifting and golfing. GOAL MET:  B SLR x1 min without pain. Discontinue Goal             Patient Education:   [x]  HEP/Education Completed: handout hamstring stretch, bird dog, fire hydrant, standing hip ABD, extension, bridge with and and march  [address] Access Code: [email or web address], short version [email or web address]  []  No new Education completed  [x]  Reviewed Prior HEP      [x]  Patient verbalized and/or demonstrated understanding of education provided.   []  Patient unable to verbalize and/or demonstrate understanding of education provided. Will continue education. []  Barriers to learning:     PLAN:  Treatment Recommendations: Strengthening, Range of Motion, Manual Therapy - Soft Tissue Mobilization, Home Exercise Program, Patient Education and Modalities    []  Plan of care initiated. Plan to see patient 2 times per week for 6 weeks to address the treatment planned outlined above.   [x]  Continue with current plan of care  []  Modify plan of care as follows:    []  Hold pending physician visit  [x]  Discharge    Time In 1314   Time Out 1338   Timed Code Minutes: 24 min   Total Treatment Time:  24 min     Electronically Signed by: Dion Sommers PT

## 2022-03-14 ENCOUNTER — APPOINTMENT (OUTPATIENT)
Dept: PHYSICAL THERAPY | Age: 32
End: 2022-03-14
Payer: COMMERCIAL

## 2022-03-16 ENCOUNTER — APPOINTMENT (OUTPATIENT)
Dept: PHYSICAL THERAPY | Age: 32
End: 2022-03-16
Payer: COMMERCIAL

## 2023-02-06 ENCOUNTER — HOSPITAL ENCOUNTER (OUTPATIENT)
Dept: ULTRASOUND IMAGING | Age: 33
Discharge: HOME | End: 2023-02-06
Payer: COMMERCIAL

## 2023-02-06 DIAGNOSIS — R10.0 ACUTE ABDOMINAL PAIN SYNDROME: ICD-10-CM

## 2023-02-06 PROCEDURE — 76705 ECHO EXAM OF ABDOMEN: CPT

## 2024-05-02 ENCOUNTER — HOSPITAL ENCOUNTER (OUTPATIENT)
Age: 34
Discharge: HOME | End: 2024-05-02
Payer: COMMERCIAL

## 2024-05-02 LAB
ALBUMIN SERPL BCG-MCNC: 4.6 G/DL (ref 3.5–5.1)
ALP SERPL-CCNC: 98 U/L (ref 38–126)
ALT SERPL W/O P-5'-P-CCNC: 105 U/L (ref 11–66)
ANION GAP SERPL CALC-SCNC: 14 MEQ/L (ref 8–16)
AST SERPL-CCNC: 48 U/L (ref 5–40)
BASOPHILS ABSOLUTE: 0.1 THOU/MM3 (ref 0–0.1)
BASOPHILS NFR BLD AUTO: 1.1 %
BILIRUB SERPL-MCNC: 0.3 MG/DL (ref 0.3–1.2)
BUN SERPL-MCNC: 16 MG/DL (ref 7–22)
CALCIUM SERPL-MCNC: 9.6 MG/DL (ref 8.5–10.5)
CHLORIDE SERPL-SCNC: 99 MEQ/L (ref 98–111)
CHOLEST SERPL-MCNC: 217 MG/DL (ref 100–199)
CO2 SERPL-SCNC: 25 MEQ/L (ref 23–33)
CREAT SERPL-MCNC: 1.1 MG/DL (ref 0.4–1.2)
DEPRECATED RDW RBC AUTO: 39.1 FL (ref 35–45)
EOSINOPHIL NFR BLD AUTO: 1.1 %
EOSINOPHILS ABSOLUTE: 0.1 THOU/MM3 (ref 0–0.4)
ERYTHROCYTE [DISTWIDTH] IN BLOOD BY AUTOMATED COUNT: 11.9 % (ref 11.5–14.5)
GFR SERPL CREATININE-BSD FRML MDRD: 90 ML/MIN/1.73M2
GLUCOSE SERPL-MCNC: 101 MG/DL (ref 70–108)
HCT VFR BLD AUTO: 49.1 % (ref 42–52)
HDLC SERPL-MCNC: 35 MG/DL
HGB BLD-MCNC: 16.6 GM/DL (ref 14–18)
IMM GRANULOCYTES # BLD AUTO: 0.03 THOU/MM3 (ref 0–0.07)
IMM GRANULOCYTES NFR BLD AUTO: 0.4 %
LDLC SERPL CALC-MCNC: 138 MG/DL
LYMPHOCYTES ABSOLUTE: 2.3 THOU/MM3 (ref 1–4.8)
LYMPHOCYTES NFR BLD AUTO: 31.9 %
MCH RBC QN AUTO: 30.3 PG (ref 26–33)
MCHC RBC AUTO-ENTMCNC: 33.8 GM/DL (ref 32.2–35.5)
MCV RBC AUTO: 89.8 FL (ref 80–94)
MONOCYTES ABSOLUTE: 0.6 THOU/MM3 (ref 0.4–1.3)
MONOCYTES NFR BLD AUTO: 8.4 %
NEUTROPHILS ABSOLUTE: 4.1 THOU/MM3 (ref 1.8–7.7)
NEUTROPHILS NFR BLD AUTO: 57.1 %
NRBC BLD AUTO-RTO: 0 /100 WBC
PLATELET # BLD AUTO: 219 THOU/MM3 (ref 130–400)
PMV BLD AUTO: 10.4 FL (ref 9.4–12.4)
POTASSIUM SERPL-SCNC: 4.1 MEQ/L (ref 3.5–5.2)
PROT SERPL-MCNC: 7.5 G/DL (ref 6.1–8)
RBC # BLD AUTO: 5.47 MILL/MM3 (ref 4.7–6.1)
SODIUM SERPL-SCNC: 138 MEQ/L (ref 135–145)
TRIGL SERPL-MCNC: 222 MG/DL (ref 0–199)
WBC # BLD AUTO: 7.1 THOU/MM3 (ref 4.8–10.8)

## 2024-05-02 PROCEDURE — 36415 COLL VENOUS BLD VENIPUNCTURE: CPT

## 2024-05-02 PROCEDURE — 80053 COMPREHEN METABOLIC PANEL: CPT

## 2024-05-02 PROCEDURE — 80061 LIPID PANEL: CPT

## 2024-05-02 PROCEDURE — 85025 COMPLETE CBC W/AUTO DIFF WBC: CPT

## (undated) DEVICE — TUBING, SUCTION, 1/4" X 20', STRAIGHT: Brand: MEDLINE INDUSTRIES, INC.

## (undated) DEVICE — SUPPORT SCROT L AD L39-44IN SFT KNIT PCH SUSP WAISTBAND

## (undated) DEVICE — GLOVE ORANGE PI 7   MSG9070

## (undated) DEVICE — 3M™ WARMING BLANKET, UPPER BODY, 10 PER CASE, 42268: Brand: BAIR HUGGER™

## (undated) DEVICE — BLADE CLIPPER GEN PURP NS

## (undated) DEVICE — NON COATED ELECTROSURGICAL NEEDLE ELECTRODE, 2.75 INCH (7 CM): Brand: MEGADYNE

## (undated) DEVICE — PREP SOL PVP IODINE 4%  4 OZ/BTL

## (undated) DEVICE — SOLUTION SURG PREP POV IOD 7.5% 4 OZ

## (undated) DEVICE — GARMENT,MEDLINE,DVT,INT,CALF,MED, GEN2: Brand: MEDLINE

## (undated) DEVICE — GAUZE,SPONGE,8"X4",12PLY,XRAY,STRL,LF: Brand: MEDLINE

## (undated) DEVICE — SOLUTION IV 1000ML LAC RINGERS PH 6.5 INJ USP VIAFLX PLAS

## (undated) DEVICE — Z DISCONTINUED BY MEDLINE USE 2711682 TRAY SKIN PREP DRY W/ PREM GLV

## (undated) DEVICE — YANKAUER,BULB TIP,W/O VENT,RIGID,STERILE: Brand: MEDLINE

## (undated) DEVICE — COVER ARMBRD W13XL28.5IN IMPERV BLU FOR OP RM

## (undated) DEVICE — ROYAL SILK SURGICAL GOWN, XXL: Brand: CONVERTORS

## (undated) DEVICE — SOLUTION IV 1000ML 0.9% SOD CHL PH 5 INJ USP VIAFLX PLAS

## (undated) DEVICE — GLOVE ORANGE PI 8   MSG9080

## (undated) DEVICE — SOLUTION IV IRRIG WATER 1000ML POUR BRL 2F7114

## (undated) DEVICE — SPONGE GZ W4XL4IN COT 12 PLY TYP VII WVN C FLD DSGN